# Patient Record
Sex: MALE | Race: BLACK OR AFRICAN AMERICAN | Employment: OTHER | URBAN - METROPOLITAN AREA
[De-identification: names, ages, dates, MRNs, and addresses within clinical notes are randomized per-mention and may not be internally consistent; named-entity substitution may affect disease eponyms.]

---

## 2017-01-25 ENCOUNTER — HOSPITAL ENCOUNTER (OUTPATIENT)
Dept: LAB | Age: 77
Discharge: HOME | End: 2017-01-25
Attending: INTERNAL MEDICINE
Payer: MEDICARE

## 2017-01-25 DIAGNOSIS — E03.9 ACQUIRED HYPOTHYROIDISM: ICD-10-CM

## 2017-01-25 DIAGNOSIS — E11.40 CONTROLLED TYPE 2 DIABETES MELLITUS WITH DIABETIC NEUROPATHY, WITHOUT LONG-TERM CURRENT USE OF INSULIN (HCC): ICD-10-CM

## 2017-01-25 PROBLEM — I10 ESSENTIAL HYPERTENSION: Status: ACTIVE | Noted: 2017-01-25

## 2017-01-25 LAB
ALBUMIN SERPL BCP-MCNC: 4 G/DL (ref 3.2–4.6)
ALBUMIN/GLOB SERPL: 1.1 {RATIO}
ALP SERPL-CCNC: 91 U/L (ref 50–136)
ALT SERPL-CCNC: 36 U/L (ref 12–65)
ANION GAP BLD CALC-SCNC: 9 MMOL/L
AST SERPL W P-5'-P-CCNC: 33 U/L (ref 15–37)
BASOPHILS # BLD AUTO: 0 K/UL (ref 0–0.2)
BASOPHILS # BLD: 1 % (ref 0–2)
BILIRUB SERPL-MCNC: 1 MG/DL (ref 0.2–1.1)
BUN SERPL-MCNC: 14 MG/DL (ref 8–23)
CALCIUM SERPL-MCNC: 8.7 MG/DL (ref 8.3–10.4)
CHLORIDE SERPL-SCNC: 102 MMOL/L (ref 98–107)
CHOLEST SERPL-MCNC: 138 MG/DL
CO2 SERPL-SCNC: 32 MMOL/L (ref 23–32)
CREAT SERPL-MCNC: 1.2 MG/DL (ref 0.6–1)
DIFFERENTIAL METHOD BLD: ABNORMAL
EOSINOPHIL # BLD: 0.1 K/UL (ref 0–0.8)
EOSINOPHIL NFR BLD: 3 % (ref 0.5–7.8)
ERYTHROCYTE [DISTWIDTH] IN BLOOD BY AUTOMATED COUNT: 12.7 % (ref 11.9–14.6)
EST. AVERAGE GLUCOSE BLD GHB EST-MCNC: 163 MG/DL
GLOBULIN SER CALC-MCNC: 3.8 G/DL
GLUCOSE SERPL-MCNC: 116 MG/DL (ref 65–100)
HBA1C MFR BLD: 7.3 % (ref 4.8–6)
HCT VFR BLD AUTO: 45.4 % (ref 41.1–50.3)
HDLC SERPL-MCNC: 52 MG/DL (ref 40–60)
HDLC SERPL: 2.7 {RATIO}
HGB BLD-MCNC: 15.6 G/DL (ref 13.6–17.2)
LDLC SERPL CALC-MCNC: 60.6 MG/DL
LIPID PROFILE,FLP: NORMAL
LYMPHOCYTES # BLD AUTO: 46 % (ref 13–44)
LYMPHOCYTES # BLD: 2 K/UL (ref 0.5–4.6)
MCH RBC QN AUTO: 30.4 PG (ref 26.1–32.9)
MCHC RBC AUTO-ENTMCNC: 34.4 G/DL (ref 31.4–35)
MCV RBC AUTO: 88.3 FL (ref 79.6–97.8)
MONOCYTES # BLD: 0.4 K/UL (ref 0.1–1.3)
MONOCYTES NFR BLD AUTO: 9 % (ref 4–12)
NEUTS SEG # BLD: 1.8 K/UL (ref 1.7–8.2)
NEUTS SEG NFR BLD AUTO: 41 % (ref 43–78)
PLATELET # BLD AUTO: 169 K/UL (ref 150–450)
PMV BLD AUTO: 10.5 FL (ref 10.8–14.1)
POTASSIUM SERPL-SCNC: 4.2 MMOL/L (ref 3.5–5.1)
PROT SERPL-MCNC: 7.8 G/DL (ref 6.3–8.2)
RBC # BLD AUTO: 5.14 M/UL (ref 4.23–5.67)
SODIUM SERPL-SCNC: 143 MMOL/L (ref 136–145)
TRIGL SERPL-MCNC: 127 MG/DL (ref 35–150)
TSH SERPL DL<=0.005 MIU/L-ACNC: 10.9 UIU/ML (ref 0.36–3.74)
VLDLC SERPL CALC-MCNC: 25.4 MG/DL
WBC # BLD AUTO: 4.4 K/UL (ref 4.3–11.1)

## 2017-01-25 PROCEDURE — 80053 COMPREHEN METABOLIC PANEL: CPT | Performed by: INTERNAL MEDICINE

## 2017-01-25 PROCEDURE — 85025 COMPLETE CBC W/AUTO DIFF WBC: CPT | Performed by: INTERNAL MEDICINE

## 2017-01-25 PROCEDURE — 82570 ASSAY OF URINE CREATININE: CPT | Performed by: INTERNAL MEDICINE

## 2017-01-25 PROCEDURE — 83036 HEMOGLOBIN GLYCOSYLATED A1C: CPT | Performed by: INTERNAL MEDICINE

## 2017-01-25 PROCEDURE — 36415 COLL VENOUS BLD VENIPUNCTURE: CPT | Performed by: INTERNAL MEDICINE

## 2017-01-25 PROCEDURE — 84443 ASSAY THYROID STIM HORMONE: CPT | Performed by: INTERNAL MEDICINE

## 2017-01-25 PROCEDURE — 80061 LIPID PANEL: CPT | Performed by: INTERNAL MEDICINE

## 2017-01-26 LAB
CREAT UR-MCNC: 94 MG/DL
MICROALBUMIN UR-MCNC: 5.49 MG/DL
MICROALBUMIN/CREAT UR-RTO: 58 MG/G

## 2017-01-26 PROCEDURE — 82043 UR ALBUMIN QUANTITATIVE: CPT | Performed by: INTERNAL MEDICINE

## 2017-01-26 NOTE — PROGRESS NOTES
Lipids good-Hb nl-A1C good at 7.3--kidney test stable-thyroid is off --not on quite enough--this could occur if missed some doses and think best to recheck it next visit

## 2017-02-28 ENCOUNTER — HOSPITAL ENCOUNTER (OUTPATIENT)
Dept: CT IMAGING | Age: 77
Discharge: HOME | End: 2017-02-28
Attending: UROLOGY
Payer: MEDICARE

## 2017-02-28 DIAGNOSIS — R31.0 GROSS HEMATURIA: ICD-10-CM

## 2017-02-28 PROCEDURE — 74011000258 HC RX REV CODE- 258: Performed by: UROLOGY

## 2017-02-28 PROCEDURE — 74178 CT ABD&PLV WO CNTR FLWD CNTR: CPT

## 2017-02-28 PROCEDURE — 74011636320 HC RX REV CODE- 636/320: Performed by: UROLOGY

## 2017-02-28 RX ORDER — SODIUM CHLORIDE 0.9 % (FLUSH) 0.9 %
10 SYRINGE (ML) INJECTION
Status: COMPLETED | OUTPATIENT
Start: 2017-02-28 | End: 2017-02-28

## 2017-02-28 RX ADMIN — IOVERSOL 100 ML: 741 INJECTION INTRA-ARTERIAL; INTRAVENOUS at 14:27

## 2017-02-28 RX ADMIN — SODIUM CHLORIDE 100 ML: 900 INJECTION, SOLUTION INTRAVENOUS at 14:27

## 2017-02-28 RX ADMIN — Medication 10 ML: at 14:27

## 2017-03-31 PROBLEM — R97.20 ELEVATED PSA: Status: ACTIVE | Noted: 2017-03-31

## 2017-09-26 PROBLEM — K59.00 CONSTIPATION: Status: ACTIVE | Noted: 2017-06-28

## 2017-09-26 PROBLEM — Z12.11 COLON CANCER SCREENING: Status: ACTIVE | Noted: 2017-06-28

## 2017-11-30 ENCOUNTER — HOSPITAL ENCOUNTER (OUTPATIENT)
Dept: GENERAL RADIOLOGY | Age: 77
Discharge: HOME | End: 2017-11-30
Payer: MEDICARE

## 2017-11-30 DIAGNOSIS — M25.551 RIGHT HIP PAIN: ICD-10-CM

## 2017-11-30 PROCEDURE — 73502 X-RAY EXAM HIP UNI 2-3 VIEWS: CPT

## 2017-12-01 NOTE — PROGRESS NOTES
X-ray shows some osteoarthritis both hips.  Can refer to ortho with persistent pain and he can call next week if conservative measures dont help

## 2017-12-04 NOTE — PROGRESS NOTES
Pt was made aware of his x-ray and he said at this point he would hold on referral to ortho and he's going to continue taking Mobic  (rx written Qt 30 with 2 refills so pt said he will try rx for the next couple months and will call us back ic sx worsens)

## 2018-01-30 PROBLEM — E78.00 HYPERCHOLESTEROLEMIA: Status: ACTIVE | Noted: 2018-01-30

## 2018-04-02 PROBLEM — R31.9 HEMATURIA: Status: ACTIVE | Noted: 2018-04-02

## 2018-04-02 PROCEDURE — 88112 CYTOPATH CELL ENHANCE TECH: CPT | Performed by: UROLOGY

## 2018-04-03 ENCOUNTER — HOSPITAL ENCOUNTER (OUTPATIENT)
Dept: LAB | Age: 78
Discharge: HOME | End: 2018-04-03

## 2018-10-01 PROBLEM — R31.0 GROSS HEMATURIA: Status: ACTIVE | Noted: 2018-10-01

## 2018-10-25 PROBLEM — R31.0 GROSS HEMATURIA: Status: RESOLVED | Noted: 2018-10-01 | Resolved: 2018-10-25

## 2019-09-24 PROBLEM — Z12.11 COLON CANCER SCREENING: Status: RESOLVED | Noted: 2017-06-28 | Resolved: 2019-09-24

## 2021-02-19 ENCOUNTER — HOSPITAL ENCOUNTER (OUTPATIENT)
Dept: CT IMAGING | Age: 81
Discharge: HOME | End: 2021-02-19
Attending: INTERNAL MEDICINE

## 2021-02-19 DIAGNOSIS — R10.9 ABDOMINAL PAIN, UNSPECIFIED ABDOMINAL LOCATION: ICD-10-CM

## 2021-02-19 RX ORDER — SODIUM CHLORIDE 0.9 % (FLUSH) 0.9 %
10 SYRINGE (ML) INJECTION
Status: COMPLETED | OUTPATIENT
Start: 2021-02-19 | End: 2021-02-19

## 2021-02-19 RX ADMIN — Medication 10 ML: at 15:26

## 2022-03-18 PROBLEM — E03.9 ACQUIRED HYPOTHYROIDISM: Status: ACTIVE | Noted: 2017-01-25

## 2022-03-18 PROBLEM — R97.20 ELEVATED PSA: Status: ACTIVE | Noted: 2017-03-31

## 2022-03-19 PROBLEM — R31.9 HEMATURIA: Status: ACTIVE | Noted: 2018-04-02

## 2022-03-19 PROBLEM — E11.40 CONTROLLED TYPE 2 DIABETES MELLITUS WITH DIABETIC NEUROPATHY, WITHOUT LONG-TERM CURRENT USE OF INSULIN (HCC): Status: ACTIVE | Noted: 2017-01-25

## 2022-03-19 PROBLEM — K59.00 CONSTIPATION: Status: ACTIVE | Noted: 2017-06-28

## 2022-03-20 PROBLEM — I10 ESSENTIAL HYPERTENSION: Status: ACTIVE | Noted: 2017-01-25

## 2022-03-20 PROBLEM — E78.00 HYPERCHOLESTEROLEMIA: Status: ACTIVE | Noted: 2018-01-30

## 2022-07-12 DIAGNOSIS — I10 ESSENTIAL (PRIMARY) HYPERTENSION: ICD-10-CM

## 2022-07-12 RX ORDER — AMLODIPINE BESYLATE 5 MG/1
TABLET ORAL
Qty: 90 TABLET | Refills: 3 | Status: SHIPPED | OUTPATIENT
Start: 2022-07-12

## 2022-08-10 ENCOUNTER — OFFICE VISIT (OUTPATIENT)
Dept: INTERNAL MEDICINE CLINIC | Facility: CLINIC | Age: 82
End: 2022-08-10
Payer: MEDICARE

## 2022-08-10 VITALS
SYSTOLIC BLOOD PRESSURE: 128 MMHG | BODY MASS INDEX: 30.52 KG/M2 | HEIGHT: 71 IN | DIASTOLIC BLOOD PRESSURE: 68 MMHG | WEIGHT: 218 LBS

## 2022-08-10 DIAGNOSIS — Z23 ENCOUNTER FOR IMMUNIZATION: ICD-10-CM

## 2022-08-10 DIAGNOSIS — E11.40 CONTROLLED TYPE 2 DIABETES MELLITUS WITH DIABETIC NEUROPATHY, WITHOUT LONG-TERM CURRENT USE OF INSULIN (HCC): Primary | ICD-10-CM

## 2022-08-10 DIAGNOSIS — E03.9 ACQUIRED HYPOTHYROIDISM: ICD-10-CM

## 2022-08-10 DIAGNOSIS — I10 ESSENTIAL HYPERTENSION: ICD-10-CM

## 2022-08-10 PROCEDURE — 3052F HG A1C>EQUAL 8.0%<EQUAL 9.0%: CPT | Performed by: INTERNAL MEDICINE

## 2022-08-10 PROCEDURE — 1036F TOBACCO NON-USER: CPT | Performed by: INTERNAL MEDICINE

## 2022-08-10 PROCEDURE — 99214 OFFICE O/P EST MOD 30 MIN: CPT | Performed by: INTERNAL MEDICINE

## 2022-08-10 PROCEDURE — 90677 PCV20 VACCINE IM: CPT | Performed by: INTERNAL MEDICINE

## 2022-08-10 PROCEDURE — G8427 DOCREV CUR MEDS BY ELIG CLIN: HCPCS | Performed by: INTERNAL MEDICINE

## 2022-08-10 PROCEDURE — 1123F ACP DISCUSS/DSCN MKR DOCD: CPT | Performed by: INTERNAL MEDICINE

## 2022-08-10 PROCEDURE — G8417 CALC BMI ABV UP PARAM F/U: HCPCS | Performed by: INTERNAL MEDICINE

## 2022-08-10 PROCEDURE — G0009 ADMIN PNEUMOCOCCAL VACCINE: HCPCS | Performed by: INTERNAL MEDICINE

## 2022-08-10 ASSESSMENT — ENCOUNTER SYMPTOMS
COUGH: 0
SHORTNESS OF BREATH: 0

## 2022-08-10 ASSESSMENT — PATIENT HEALTH QUESTIONNAIRE - PHQ9
SUM OF ALL RESPONSES TO PHQ QUESTIONS 1-9: 0
SUM OF ALL RESPONSES TO PHQ9 QUESTIONS 1 & 2: 0
SUM OF ALL RESPONSES TO PHQ QUESTIONS 1-9: 0
2. FEELING DOWN, DEPRESSED OR HOPELESS: 0
SUM OF ALL RESPONSES TO PHQ QUESTIONS 1-9: 0
SUM OF ALL RESPONSES TO PHQ QUESTIONS 1-9: 0
1. LITTLE INTEREST OR PLEASURE IN DOING THINGS: 0

## 2022-08-10 NOTE — PROGRESS NOTES
SUBJECTIVE:   Jade Fisher is a 81 y.o. male seen for a visit regarding   Chief Complaint   Patient presents with    Cholesterol Problem     4 month f/u    Diabetes    Hypertension        Diabetes  He presents for his follow-up diabetic visit. He has type 2 diabetes mellitus. His disease course has been stable. There are no hypoglycemic associated symptoms. Pertinent negatives for diabetes include no chest pain. Symptoms are stable. He is compliant with treatment all of the time. There is no change (checks qod -runs 130's to 140's -rare 160-A1C 8.2 in April -was 8.1 prior) in his home blood glucose trend. Hypertension  This is a chronic problem. The current episode started more than 1 year ago. The problem is unchanged. Pertinent negatives include no chest pain, palpitations or shortness of breath. Past treatments include ACE inhibitors, calcium channel blockers and diuretics. Hyperlipidemia  This is a chronic problem. The problem is controlled. Pertinent negatives include no chest pain or shortness of breath. Current antihyperlipidemic treatment includes statins. There are no compliance problems. Past Medical History, Past Surgical History, Family history, Social History, and Medications were all reviewed with the patient today and updated as necessary.        Current Outpatient Medications   Medication Sig Dispense Refill    amLODIPine (NORVASC) 5 MG tablet TAKE 1 TABLET BY MOUTH EVERY DAY 90 tablet 3    atorvastatin (LIPITOR) 20 MG tablet Take 20 mg by mouth daily      bimatoprost (LUMIGAN) 0.01 % SOLN ophthalmic drops Apply 1 drop to eye      cyanocobalamin 1000 MCG tablet Take 1,000 mcg by mouth daily      finasteride (PROSCAR) 5 MG tablet Take 5 mg by mouth daily      glipiZIDE-metFORMIN (METAGLIP) 5-500 MG per tablet 2 in the AM and 1 at HS      hydroCHLOROthiazide (HYDRODIURIL) 25 MG tablet Take 37.5 mg by mouth daily      ketoconazole (NIZORAL) 2 % cream Apply topically 2 times daily levothyroxine (SYNTHROID) 125 MCG tablet TAKE 2 TABS BY MOUTH DAILY (BEFORE BREAKFAST). USES 250MG 4 DAYS 125 NEXT 3DAYS (FRI, SAT, SUN)      losartan (COZAAR) 100 MG tablet Take 100 mg by mouth daily      tamsulosin (FLOMAX) 0.4 MG capsule Take 0.4 mg by mouth daily       No current facility-administered medications for this visit. No Known Allergies  Patient Active Problem List   Diagnosis    Benign prostatic hyperplasia with urinary obstruction and other lower urinary tract symptoms    Elevated PSA    Acquired hypothyroidism    Urinary retention    Osteoarthritis of multiple joints    Hematuria    Constipation    Controlled type 2 diabetes mellitus with diabetic neuropathy, without long-term current use of insulin ([facility])    S/P total knee replacement    Hypercholesterolemia    Essential hypertension     Social History     Tobacco Use    Smoking status: Former     Packs/day: 0.50     Types: Cigarettes     Quit date: 1988     Years since quittin.2    Smokeless tobacco: Never   Substance Use Topics    Alcohol use: No          Review of Systems   Constitutional:  Negative for unexpected weight change. Respiratory:  Negative for cough and shortness of breath. Cardiovascular:  Negative for chest pain and palpitations. Endocrine: Negative for cold intolerance and heat intolerance. TSH 5.50 in April down from 7.47-uses 250 for 4d and 125 next 3d   Neurological:  Negative for numbness. OBJECTIVE:  /68   Ht 5' 11\" (1.803 m)   Wt 218 lb (98.9 kg)   BMI 30.40 kg/m²      Physical Exam       Medical problems and test results were reviewed with the patient today. ASSESSMENT and PLAN     1. Controlled type 2 diabetes mellitus with diabetic neuropathy, without long-term current use of insulin (HCC)  -     Hemoglobin A1C; Future  -     Microalbumin / Creatinine Urine Ratio; Future  -     Basic Metabolic Panel; Future  2. Essential hypertension  3.  Acquired hypothyroidism  -     TSH with Reflex; Future  4. Encounter for immunization  -     Pneumococcal, PCV20, PREVNAR 21, (age 25 yrs+), IM, PF     the following changes in treatment are made A1c still hi-go to 2 bid glip/met--BP good; lipid was ok recheck thyroid -at 5 TSH ok -see 4 mo-prevnar today  lab results and schedule for future lab studies reviewed with patient  reviewed medications and side effects in detail     Return in about 4 months (around 12/10/2022).

## 2022-08-12 DIAGNOSIS — E11.40 CONTROLLED TYPE 2 DIABETES MELLITUS WITH DIABETIC NEUROPATHY, WITHOUT LONG-TERM CURRENT USE OF INSULIN (HCC): ICD-10-CM

## 2022-08-12 DIAGNOSIS — E03.9 ACQUIRED HYPOTHYROIDISM: ICD-10-CM

## 2022-08-12 LAB
ANION GAP SERPL CALC-SCNC: 6 MMOL/L (ref 7–16)
BUN SERPL-MCNC: 19 MG/DL (ref 8–23)
CALCIUM SERPL-MCNC: 8.8 MG/DL (ref 8.3–10.4)
CHLORIDE SERPL-SCNC: 112 MMOL/L (ref 98–107)
CO2 SERPL-SCNC: 23 MMOL/L (ref 21–32)
CREAT SERPL-MCNC: 1.4 MG/DL (ref 0.8–1.5)
CREAT UR-MCNC: 183 MG/DL
GLUCOSE SERPL-MCNC: 133 MG/DL (ref 65–100)
MICROALBUMIN UR-MCNC: 3.72 MG/DL
MICROALBUMIN/CREAT UR-RTO: 20 MG/G
POTASSIUM SERPL-SCNC: 3.6 MMOL/L (ref 3.5–5.1)
SODIUM SERPL-SCNC: 141 MMOL/L (ref 138–145)
TSH W FREE THYROID IF ABNORMAL: 6.9 UIU/ML (ref 0.36–3.74)

## 2022-08-13 LAB
EST. AVERAGE GLUCOSE BLD GHB EST-MCNC: 174 MG/DL
HBA1C MFR BLD: 7.7 % (ref 4.8–5.6)
T4 FREE SERPL-MCNC: 0.8 NG/DL (ref 0.78–1.46)

## 2022-10-03 ENCOUNTER — TELEPHONE (OUTPATIENT)
Dept: UROLOGY | Age: 82
End: 2022-10-03

## 2022-10-03 ENCOUNTER — OFFICE VISIT (OUTPATIENT)
Dept: UROLOGY | Age: 82
End: 2022-10-03
Payer: MEDICARE

## 2022-10-03 DIAGNOSIS — N40.1 BENIGN PROSTATIC HYPERPLASIA WITH LOWER URINARY TRACT SYMPTOMS, SYMPTOM DETAILS UNSPECIFIED: ICD-10-CM

## 2022-10-03 DIAGNOSIS — R97.20 ELEVATED PSA: Primary | ICD-10-CM

## 2022-10-03 DIAGNOSIS — N39.41 URGE INCONTINENCE: ICD-10-CM

## 2022-10-03 LAB
BILIRUBIN, URINE, POC: NEGATIVE
BLOOD URINE, POC: NEGATIVE
GLUCOSE URINE, POC: NEGATIVE
KETONES, URINE, POC: NEGATIVE
LEUKOCYTE ESTERASE, URINE, POC: NEGATIVE
NITRITE, URINE, POC: NEGATIVE
PH, URINE, POC: 6 (ref 4.6–8)
PROTEIN,URINE, POC: NEGATIVE
PVR, POC: ABNORMAL CC
SPECIFIC GRAVITY, URINE, POC: 1.02 (ref 1–1.03)
URINALYSIS CLARITY, POC: NORMAL
URINALYSIS COLOR, POC: NORMAL
UROBILINOGEN, POC: NORMAL

## 2022-10-03 PROCEDURE — 51798 US URINE CAPACITY MEASURE: CPT | Performed by: UROLOGY

## 2022-10-03 PROCEDURE — 99214 OFFICE O/P EST MOD 30 MIN: CPT | Performed by: UROLOGY

## 2022-10-03 PROCEDURE — G8417 CALC BMI ABV UP PARAM F/U: HCPCS | Performed by: UROLOGY

## 2022-10-03 PROCEDURE — 81002 URINALYSIS NONAUTO W/O SCOPE: CPT | Performed by: UROLOGY

## 2022-10-03 PROCEDURE — G8427 DOCREV CUR MEDS BY ELIG CLIN: HCPCS | Performed by: UROLOGY

## 2022-10-03 PROCEDURE — 1036F TOBACCO NON-USER: CPT | Performed by: UROLOGY

## 2022-10-03 PROCEDURE — 1123F ACP DISCUSS/DSCN MKR DOCD: CPT | Performed by: UROLOGY

## 2022-10-03 PROCEDURE — G8484 FLU IMMUNIZE NO ADMIN: HCPCS | Performed by: UROLOGY

## 2022-10-03 NOTE — PROGRESS NOTES
[facility]  [address]  [address] [facility]  [facility], [address] [facility]  [phone]    Nael Membreno  : 1940    Chief Complaint   Patient presents with    Elevated PSA        HPI     Nael Membreno is a 81 y.o. male   History of BPH, microwave thermotherapy treatment of the prostate in the past. The patient had been taking Avodart chronically. In  started having some gross hematuria, some dysuria. Went to   an  facility and was given an antibiotic. CT was obtained. CT showed  IMPRESSION:   1. Large mass in the bladder with circumferential wall   thickening. Although this may simply represent hematoma or blood   clot, neoplasm must be strongly considered. Recommend correlation   with direct visualization at cystoscopy. 2. A 2 mm nonobstructing right peripelvic renal stone. There is   no hydronephrosis or hydroureter. 3. Lobular hyperplasia of the left adrenal gland. 4. Indeterminate 1.5 x 1.0 cm right iliac lymph node. 5. Indeterminate tiny sclerotic foci about the pelvis may simple   represent bone islands. Cysto in office showed large BPH. Had TURP 14 . path shows 12   gm BPH and chronic prostatitis. Has stopped the Avodart. Is voiding much better, but having some urgency, double voiding,   nocturia x 3. Cysto showed S/p TURP but with large anterior lobe, both   intravesical and partially obstructing the prostate fossa. ,   without bladder neck contracture. We had discussed repeat TURP. He stated that he would rather take meds. He is on Tamsulosin and LUTS are much better. In  had  gross painless hematuria on 3 occasions. Had CT w/wo on 17: IMPRESSION:   1. The only potential etiology for the patient's hematuria is that there is  severe enlargement of the prostate gland which has slightly worsened when  compared to the prior CT study.  No evidence for stone disease, enhancing renal  cortical lesion, or focal urinary bladder wall lesion is otherwise seen. Hematuria has resolved. PSA 8.9 in 2015. Cysto in 3-17 showed s/p TURP, huge intravesical prostate lobe anteriorly. He was started on Finasteride in addition to Tamsulosin. ? Hematuria felt to be  from massive BPH. . If he comes to surgery , may need open prostatectomy. He is voiding well. PSA 5.7 in 9-17. PSA 3.9 in 3-18. Had  microscopic hematuria in 4-18; had negative urine cytology. PSA 4.2 in 9-18. He called on 9-25-18 after having gross hematuria. This stopped, currently without problems. Had Staph UTI in 10-18. PSA 3.2 in 9-19, corrected to 6.4 .   . Remains on tamsulosin and finasteride. Has been on finasteride since 2017. PSA 4.1 in 9-20, corrected to 8.2   Has tried Cialis 5 mg daily with no improvement in ED. ALEX in 10-20 showed 4+ prostate without nodules. PSA 5.4 in 3-21, corrected to 10.8. He is having some urge incontinence. Wears a pad. Slow rise in PSA. Urge incontinence is bothering him. here for PVR,cysto, AUASS. Continue tamsulosin and finasteride. Cysto in 4-21 showed huge intravesical lobe     PVR 8 ml by US  AUASS is 22 , with highest score of 5 for \"urgency\". QOL is \"pleased\". Added Detrol to tamsulosin and finasteride in 4-21. He states that his voiding is not a problem. PVR 52 ml by US today. PSA 5.5, corrected to 11.0. PSA 5.2 in 9-22, corrected to 10.4. He states that he is having urgency and urge incontinence. PVR today by US is >120 ml. Past Medical History:   Diagnosis Date    Chronic pain     arthritic    Diabetes ([address] 2000?     typell, chks blood glucose rarely , last HA1B 5.6    Enlarged prostate     \"improved since TURP\"    Hypercholesteremia     Hypertension     well controlled by valsartan/hctz    Hypothyroidism          Obesity (BMI 30.0-34.9)     Osteoarthritis          Vitamin B 12 deficiency     Vitamin D deficiency      Past Surgical History:   Procedure Laterality Date    CHOLECYSTECTOMY, LAPAROSCOPIC  2007 COLONOSCOPY  2017    ORTHOPEDIC SURGERY      Total right knee 2015    OTHER SURGICAL HISTORY      prostate laser surgery \"before the TURP\"    TURP  2013     Current Outpatient Medications   Medication Sig Dispense Refill    amLODIPine (NORVASC) 5 MG tablet TAKE 1 TABLET BY MOUTH EVERY DAY 90 tablet 3    atorvastatin (LIPITOR) 20 MG tablet Take 20 mg by mouth daily      bimatoprost (LUMIGAN) 0.01 % SOLN ophthalmic drops Apply 1 drop to eye      cyanocobalamin 1000 MCG tablet Take 1,000 mcg by mouth daily      finasteride (PROSCAR) 5 MG tablet Take 5 mg by mouth daily      glipiZIDE-metFORMIN (METAGLIP) 5-500 MG per tablet 2 in the AM and 1 at HS      hydroCHLOROthiazide (HYDRODIURIL) 25 MG tablet Take 37.5 mg by mouth daily      ketoconazole (NIZORAL) 2 % cream Apply topically 2 times daily      levothyroxine (SYNTHROID) 125 MCG tablet TAKE 2 TABS BY MOUTH DAILY (BEFORE BREAKFAST). USES 250MG 4 DAYS 125 NEXT 3DAYS (FRI, SAT, SUN)      losartan (COZAAR) 100 MG tablet Take 100 mg by mouth daily      tamsulosin (FLOMAX) 0.4 MG capsule Take 0.4 mg by mouth daily       No current facility-administered medications for this visit.      No Known Allergies  Social History     Socioeconomic History    Marital status:      Spouse name: Not on file    Number of children: Not on file    Years of education: Not on file    Highest education level: Not on file   Occupational History    Not on file   Tobacco Use    Smoking status: Former     Packs/day: 0.50     Types: Cigarettes     Quit date: 1988     Years since quittin.3    Smokeless tobacco: Never   Substance and Sexual Activity    Alcohol use: No    Drug use: No    Sexual activity: Not on file   Other Topics Concern    Not on file   Social History Narrative    Not on file     Social Determinants of Health     Financial Resource Strain: Not on file   Food Insecurity: Not on file   Transportation Needs: Not on file   Physical Activity: Not on file Stress: Not on file   Social Connections: Not on file   Intimate Partner Violence: Not on file   Housing Stability: Not on file     Family History   Problem Relation Age of Onset    Cancer Mother     Cancer Father     Hypertension Father     Cancer Sister     Hypertension Brother     Stroke Brother     Diabetes Sister     Hypertension Sister     Hypertension Sister     Hypertension Sister     Hypertension Sister     Hypertension Sister     Hypertension Brother     Stroke Brother     Hypertension Brother     Stroke Brother     Hypertension Brother     Stroke Brother        ROS    There were no vitals taken for this visit. Urinalysis  UA - Dipstick  No results found for this or any previous visit. UA - Micro  WBC - 0  RBC - 0  Bacteria - 0  Epith - 0    Physical Exam    Assessment and Plan    Paige Castrejon was seen today for elevated psa. Diagnoses and all orders for this visit:    Elevated PSA  -     AMB POC URINALYSIS DIP STICK MANUAL W/O MICRO  -     AMB POC PVR, YORDAN,POST-VOID RES,US,NON-IMAGING    Benign prostatic hyperplasia with lower urinary tract symptoms, symptom details unspecified  -     AMB POC PVR, YORDAN,POST-VOID RES,US,NON-IMAGING    Urge incontinence     CT from 2021 shows huge prostate with large intravesical component. Follow-up and Dispositions    Return in 4 weeks (on 10/31/2022). is retaining urine. Recommend that he stop the Detrol. Conitinue tamsulosin and finasteride. Chart says he's not on Detrol. May need surgery. Will return for TRUS to measure prostate. Trus to measure prostate on return no biopsy. Told him to take Fleet's enema.

## 2022-10-31 ENCOUNTER — OFFICE VISIT (OUTPATIENT)
Dept: UROLOGY | Age: 82
End: 2022-10-31
Payer: MEDICARE

## 2022-10-31 DIAGNOSIS — R97.20 ELEVATED PSA: ICD-10-CM

## 2022-10-31 DIAGNOSIS — R33.9 URINARY RETENTION: ICD-10-CM

## 2022-10-31 DIAGNOSIS — N39.41 URGE INCONTINENCE: ICD-10-CM

## 2022-10-31 DIAGNOSIS — N40.1 BENIGN PROSTATIC HYPERPLASIA WITH LOWER URINARY TRACT SYMPTOMS, SYMPTOM DETAILS UNSPECIFIED: Primary | ICD-10-CM

## 2022-10-31 PROCEDURE — G8427 DOCREV CUR MEDS BY ELIG CLIN: HCPCS | Performed by: UROLOGY

## 2022-10-31 PROCEDURE — 1123F ACP DISCUSS/DSCN MKR DOCD: CPT | Performed by: UROLOGY

## 2022-10-31 PROCEDURE — G8417 CALC BMI ABV UP PARAM F/U: HCPCS | Performed by: UROLOGY

## 2022-10-31 PROCEDURE — G8484 FLU IMMUNIZE NO ADMIN: HCPCS | Performed by: UROLOGY

## 2022-10-31 PROCEDURE — 99213 OFFICE O/P EST LOW 20 MIN: CPT | Performed by: UROLOGY

## 2022-10-31 PROCEDURE — 1036F TOBACCO NON-USER: CPT | Performed by: UROLOGY

## 2022-10-31 NOTE — PROGRESS NOTES
[facility]  [address]   [address]  [facility], [address] [facility]  [phone]    Anirudh Asencio  : 1940         HPI   81 y.o., male   History of BPH, microwave thermotherapy treatment of the prostate in the past. The patient had been taking Avodart chronically. In  started having some gross hematuria, some dysuria. Went to   an  facility and was given an antibiotic. CT was obtained. CT showed  IMPRESSION:   1. Large mass in the bladder with circumferential wall   thickening. Although this may simply represent hematoma or blood   clot, neoplasm must be strongly considered. Recommend correlation   with direct visualization at cystoscopy. 2. A 2 mm nonobstructing right peripelvic renal stone. There is   no hydronephrosis or hydroureter. 3. Lobular hyperplasia of the left adrenal gland. 4. Indeterminate 1.5 x 1.0 cm right iliac lymph node. 5. Indeterminate tiny sclerotic foci about the pelvis may simple   represent bone islands. Cysto in office showed large BPH. Had TURP 14 . path shows 12   gm BPH and chronic prostatitis. Has stopped the Avodart. Is voiding much better, but having some urgency, double voiding,   nocturia x 3. Cysto showed S/p TURP but with large anterior lobe, both   intravesical and partially obstructing the prostate fossa. ,   without bladder neck contracture. We had discussed repeat TURP. He stated that he would rather take meds. He is on Tamsulosin and LUTS are much better. In  had  gross painless hematuria on 3 occasions. Had CT w/wo on 17: IMPRESSION:   1. The only potential etiology for the patient's hematuria is that there is  severe enlargement of the prostate gland which has slightly worsened when  compared to the prior CT study. No evidence for stone disease, enhancing renal  cortical lesion, or focal urinary bladder wall lesion is otherwise seen. Hematuria has resolved. PSA 8.9 in 2015.   Cysto in 3-17 showed s/p TURP, huge intravesical prostate lobe anteriorly. He was started on Finasteride in addition to Tamsulosin. ? Hematuria felt to be  from massive BPH. . If he comes to surgery , may need open prostatectomy. He is voiding well. PSA 5.7 in 9-17. PSA 3.9 in 3-18. Had  microscopic hematuria in 4-18; had negative urine cytology. PSA 4.2 in 9-18. He called on 9-25-18 after having gross hematuria. This stopped, currently without problems. Had Staph UTI in 10-18. PSA 3.2 in 9-19, corrected to 6.4 .   . Remains on tamsulosin and finasteride. Has been on finasteride since 2017. PSA 4.1 in 9-20, corrected to 8.2   Has tried Cialis 5 mg daily with no improvement in ED. ALEX in 10-20 showed 4+ prostate without nodules. PSA 5.4 in 3-21, corrected to 10.8. He is having some urge incontinence. Wears a pad. Slow rise in PSA. Urge incontinence is bothering him. Continues tamsulosin and finasteride. Cysto in 4-21 showed huge intravesical lobe     PVR 8 ml by US  AUASS is 22 , with highest score of 5 for \"urgency\". QOL is \"pleased\". Added Detrol to tamsulosin and finasteride in 4-21. He states that his voiding is not a problem. PVR 52 ml by US today. PSA 5.5, corrected to 11.0. PSA 5.2 in 9-22, corrected to 10.4. He states that he is having urgency and urge incontinence. PVR in 10-22 by US is >120 ml.     CT from 2021 shows huge prostate with large intravesical component. is retaining urine. stopped the Detrol. Conitinue tamsulosin and finasteride. Chart says he's not on Detrol. May need surgery. Will return for TRUS to measure prostate. Past Medical History:   Diagnosis Date    Chronic pain     arthritic    Diabetes ([facility] [address] 2000?     typell, chks blood glucose rarely , last HA1B 5.6    Enlarged prostate     \"improved since TURP\"    Hypercholesteremia     Hypertension     well controlled by valsartan/hctz    Hypothyroidism          Obesity (BMI 30.0-34.9)     Osteoarthritis Vitamin B 12 deficiency     Vitamin D deficiency      Past Surgical History:   Procedure Laterality Date    CHOLECYSTECTOMY, LAPAROSCOPIC  2007    COLONOSCOPY  2017    ORTHOPEDIC SURGERY      Total right knee 2015    OTHER SURGICAL HISTORY      prostate laser surgery \"before the TURP\"    TURP  2013     Current Outpatient Medications   Medication Sig Dispense Refill    amLODIPine (NORVASC) 5 MG tablet TAKE 1 TABLET BY MOUTH EVERY DAY 90 tablet 3    atorvastatin (LIPITOR) 20 MG tablet Take 20 mg by mouth daily      bimatoprost (LUMIGAN) 0.01 % SOLN ophthalmic drops Apply 1 drop to eye      cyanocobalamin 1000 MCG tablet Take 1,000 mcg by mouth daily      finasteride (PROSCAR) 5 MG tablet Take 5 mg by mouth daily      glipiZIDE-metFORMIN (METAGLIP) 5-500 MG per tablet 2 in the AM and 1 at HS      hydroCHLOROthiazide (HYDRODIURIL) 25 MG tablet Take 37.5 mg by mouth daily      ketoconazole (NIZORAL) 2 % cream Apply topically 2 times daily      levothyroxine (SYNTHROID) 125 MCG tablet TAKE 2 TABS BY MOUTH DAILY (BEFORE BREAKFAST). USES 250MG 4 DAYS 125 NEXT 3DAYS (FRI, SAT, SUN)      losartan (COZAAR) 100 MG tablet Take 100 mg by mouth daily      tamsulosin (FLOMAX) 0.4 MG capsule Take 0.4 mg by mouth daily       No current facility-administered medications for this visit.      No Known Allergies  Social History     Socioeconomic History    Marital status:      Spouse name: Not on file    Number of children: Not on file    Years of education: Not on file    Highest education level: Not on file   Occupational History    Not on file   Tobacco Use    Smoking status: Former     Packs/day: 0.50     Types: Cigarettes     Quit date: 1988     Years since quittin.4    Smokeless tobacco: Never   Substance and Sexual Activity    Alcohol use: No    Drug use: No    Sexual activity: Not on file   Other Topics Concern    Not on file   Social History Narrative    Not on file     Social Determinants of Health Financial Resource Strain: Not on file   Food Insecurity: Not on file   Transportation Needs: Not on file   Physical Activity: Not on file   Stress: Not on file   Social Connections: Not on file   Intimate Partner Violence: Not on file   Housing Stability: Not on file     Family History   Problem Relation Age of Onset    Cancer Mother     Cancer Father     Hypertension Father     Cancer Sister     Hypertension Brother     Stroke Brother     Diabetes Sister     Hypertension Sister     Hypertension Sister     Hypertension Sister     Hypertension Sister     Hypertension Sister     Hypertension Brother     Stroke Brother     Hypertension Brother     Stroke Brother     Hypertension Brother     Stroke Brother              Physical Exam  General   Mental Status - Patient is alert and oriented X3. Build & Nutrition - Well nourished. Chest and Lung Exam   Chest and lung exam reveals  - normal excursion with symmetric chest walls, quiet, even and easy respiratory effort with no use of accessory muscles and on auscultation, normal breath sounds, no adventitious sounds and normal vocal resonance. Cardiovascular   Cardiovascular examination reveals  - normal heart sounds, regular rate and rhythm with no murmurs. Abdomen   Palpation/Percussion: Palpation and Percussion of the abdomen reveal - Non Tender, No Rebound tenderness, No Rigidity (guarding), No hepatosplenomegaly, No Palpable abdominal masses and Soft. Hernia - Bilateral - No Hernia(s) present. TRANSRECTAL ULTRASOUND GUIDED BIOPSY OF THE PROSTATE    All risks, benefits and alternatives were again reviewed and he is willing to proceed at this time. The patient was placed in the left lateral decubitus position and the transrectal ultrasound probe was inserted into the rectum. The prostate was visualized and measured. The prostate appeared homogenous in appearance. Measured prostate volume is listed below. The ultrasound probe was removed.   The

## 2022-11-02 ENCOUNTER — TELEPHONE (OUTPATIENT)
Dept: UROLOGY | Age: 82
End: 2022-11-02

## 2022-11-02 NOTE — TELEPHONE ENCOUNTER
----- Message from Marisela Gallo MD sent at 10/31/2022 10:34 AM EDT -----  Schedule monopolar TURP. Did orders.

## 2022-11-07 NOTE — TELEPHONE ENCOUNTER
Procedures: Procedure(s):   CYSTOSCOPY MONOPOLAR TRANSURETHRAL RESECTION PROSTATE   Date: 12/1/2022   Time: 0800   Location: [facility] OP OR 04     Scheduled.

## 2022-11-30 ENCOUNTER — ANESTHESIA EVENT (OUTPATIENT)
Dept: SURGERY | Age: 82
End: 2022-11-30
Payer: MEDICARE

## 2022-11-30 RX ORDER — ACETAMINOPHEN 500 MG
500 TABLET ORAL EVERY 6 HOURS PRN
COMMUNITY

## 2022-11-30 RX ORDER — TAMSULOSIN HYDROCHLORIDE 0.4 MG/1
0.4 CAPSULE ORAL NIGHTLY
Status: ON HOLD | COMMUNITY
End: 2022-12-04 | Stop reason: HOSPADM

## 2022-11-30 NOTE — PERIOP NOTE
Patient wife Angus Puri. verified name and . POA documents in EHR. Order for consent not found in EHR: patient verifies procedure. Type 2 surgery, Phone assessment complete. Orders  received. Labs per surgeon: CBC with diff, UA, UA CX- pt wife states pt not available to come in for labs- pt working. Labs per anesthesia protocol: POC glucose    Patient wife answered medical/surgical history questions at their best of ability. All prior to admission medications documented in [facility]. Patient wife instructed to take the following medications the day of surgery according to anesthesia guidelines with a small sip of water: Norvasc, Lipitor, Proscar, and Synthroid. Hold all vitamins 7 days prior to surgery and NSAIDS 5 days prior to surgery. Prescription meds to hold:no additional.  Patient instructed on the following:    > Arrive at [facility], time of arrival to be called the day before by 1700  > NPO after midnight, unless otherwise indicated, including gum, mints, and ice chips  > Responsible adult must drive patient to the hospital, stay during surgery, and patient will need supervision 24 hours after anesthesia  > Use antibacterial Soap in shower the night before surgery and on the morning of surgery  > All piercings must be removed prior to arrival.    > Leave all valuables (money and jewelry) at home but bring insurance card and ID on DOS.   > You may be required to pay a deductible or co-pay on the day of your procedure. You can pre-pay by calling [phone] if your surgery is at the [facility] or [phone] if your surgery is at the [facility]. > Do not wear make-up, nail polish, lotions, cologne, perfumes, powders, or oil on skin. Artificial nails are not permitted.

## 2022-11-30 NOTE — PERIOP NOTE
Message left on vm to call back to receive preop instructions as well as to give arrival time information.   Our number given

## 2022-11-30 NOTE — PERIOP NOTE
Directly informed patient and or family member of pre op arrival time 36 on 12/1. All questions answered. Pre op instructions reviewed. Left contact information for any additional questions or needs.

## 2022-12-01 ENCOUNTER — ANESTHESIA (OUTPATIENT)
Dept: SURGERY | Age: 82
End: 2022-12-01
Payer: MEDICARE

## 2022-12-01 ENCOUNTER — APPOINTMENT (OUTPATIENT)
Dept: NON INVASIVE DIAGNOSTICS | Age: 82
End: 2022-12-01
Attending: UROLOGY
Payer: MEDICARE

## 2022-12-01 ENCOUNTER — HOSPITAL ENCOUNTER (OUTPATIENT)
Age: 82
Setting detail: OBSERVATION
LOS: 1 days | Discharge: HOME | End: 2022-12-04
Attending: UROLOGY | Admitting: UROLOGY
Payer: MEDICARE

## 2022-12-01 DIAGNOSIS — Z09 HOSPITAL DISCHARGE FOLLOW-UP: Primary | ICD-10-CM

## 2022-12-01 DIAGNOSIS — I44.2 CHB (COMPLETE HEART BLOCK) (HCC): ICD-10-CM

## 2022-12-01 DIAGNOSIS — I44.2 COMPLETE HEART BLOCK (HCC): ICD-10-CM

## 2022-12-01 DIAGNOSIS — N40.1 BENIGN PROSTATIC HYPERPLASIA WITH LOWER URINARY TRACT SYMPTOMS, SYMPTOM DETAILS UNSPECIFIED: ICD-10-CM

## 2022-12-01 PROBLEM — E03.9 ACQUIRED HYPOTHYROIDISM: Status: ACTIVE | Noted: 2017-01-25

## 2022-12-01 PROBLEM — N13.8 BPH WITH OBSTRUCTION/LOWER URINARY TRACT SYMPTOMS: Status: ACTIVE | Noted: 2022-12-01

## 2022-12-01 PROBLEM — E78.00 HYPERCHOLESTEROLEMIA: Status: ACTIVE | Noted: 2018-01-30

## 2022-12-01 PROBLEM — I10 ESSENTIAL HYPERTENSION: Status: ACTIVE | Noted: 2017-01-25

## 2022-12-01 PROBLEM — E11.40 CONTROLLED TYPE 2 DIABETES MELLITUS WITH DIABETIC NEUROPATHY, WITHOUT LONG-TERM CURRENT USE OF INSULIN (HCC): Status: ACTIVE | Noted: 2017-01-25

## 2022-12-01 LAB
ABO + RH BLD: NORMAL
ANION GAP SERPL CALC-SCNC: 4 MMOL/L (ref 2–11)
BLOOD GROUP ANTIBODIES SERPL: NORMAL
BUN SERPL-MCNC: 21 MG/DL (ref 8–23)
CALCIUM SERPL-MCNC: 8.3 MG/DL (ref 8.3–10.4)
CHLORIDE SERPL-SCNC: 106 MMOL/L (ref 101–110)
CO2 SERPL-SCNC: 28 MMOL/L (ref 21–32)
CREAT BLD-MCNC: 1.19 MG/DL (ref 0.8–1.5)
CREAT SERPL-MCNC: 1.5 MG/DL (ref 0.8–1.5)
ECHO AO ASC DIAM: 3.6 CM
ECHO AO ASCENDING AORTA INDEX: 1.65 CM/M2
ECHO AO ROOT DIAM: 4.1 CM
ECHO AO ROOT INDEX: 1.88 CM/M2
ECHO AV AREA PEAK VELOCITY: 3.6 CM2
ECHO AV AREA VTI: 3.8 CM2
ECHO AV AREA/BSA PEAK VELOCITY: 1.7 CM2/M2
ECHO AV AREA/BSA VTI: 1.7 CM2/M2
ECHO AV MEAN GRADIENT: 2 MMHG
ECHO AV MEAN VELOCITY: 0.7 M/S
ECHO AV PEAK GRADIENT: 4 MMHG
ECHO AV PEAK VELOCITY: 1 M/S
ECHO AV VELOCITY RATIO: 0.8
ECHO AV VTI: 25.5 CM
ECHO BSA: 2.22 M2
ECHO EST RA PRESSURE: 3 MMHG
ECHO IVC PROX: 2 CM
ECHO LA AREA 2C: 18.7 CM2
ECHO LA AREA 4C: 24.7 CM2
ECHO LA DIAMETER INDEX: 1.61 CM/M2
ECHO LA DIAMETER: 3.5 CM
ECHO LA MAJOR AXIS: 6.7 CM
ECHO LA MINOR AXIS: 5.1 CM
ECHO LA TO AORTIC ROOT RATIO: 0.85
ECHO LA VOL 2C: 57 ML (ref 18–58)
ECHO LA VOL 4C: 71 ML (ref 18–58)
ECHO LA VOLUME INDEX A2C: 26 ML/M2 (ref 16–34)
ECHO LA VOLUME INDEX A4C: 33 ML/M2 (ref 16–34)
ECHO LV E' LATERAL VELOCITY: 7 CM/S
ECHO LV E' SEPTAL VELOCITY: 7 CM/S
ECHO LV EDV A2C: 114 ML
ECHO LV EDV A4C: 180 ML
ECHO LV EDV INDEX A4C: 83 ML/M2
ECHO LV EDV NDEX A2C: 52 ML/M2
ECHO LV EJECTION FRACTION A2C: 61 %
ECHO LV EJECTION FRACTION A4C: 62 %
ECHO LV EJECTION FRACTION BIPLANE: 61 % (ref 55–100)
ECHO LV ESV A2C: 45 ML
ECHO LV ESV A4C: 69 ML
ECHO LV ESV INDEX A2C: 21 ML/M2
ECHO LV ESV INDEX A4C: 32 ML/M2
ECHO LV FRACTIONAL SHORTENING: 34 % (ref 28–44)
ECHO LV INTERNAL DIMENSION DIASTOLE INDEX: 2.02 CM/M2
ECHO LV INTERNAL DIMENSION DIASTOLIC: 4.4 CM (ref 4.2–5.9)
ECHO LV INTERNAL DIMENSION SYSTOLIC INDEX: 1.33 CM/M2
ECHO LV INTERNAL DIMENSION SYSTOLIC: 2.9 CM
ECHO LV IVSD: 1.2 CM (ref 0.6–1)
ECHO LV MASS 2D: 215.1 G (ref 88–224)
ECHO LV MASS INDEX 2D: 98.7 G/M2 (ref 49–115)
ECHO LV POSTERIOR WALL DIASTOLIC: 1.4 CM (ref 0.6–1)
ECHO LV RELATIVE WALL THICKNESS RATIO: 0.64
ECHO LVOT AREA: 4.5 CM2
ECHO LVOT AV VTI INDEX: 0.85
ECHO LVOT DIAM: 2.4 CM
ECHO LVOT MEAN GRADIENT: 1 MMHG
ECHO LVOT PEAK GRADIENT: 3 MMHG
ECHO LVOT PEAK VELOCITY: 0.8 M/S
ECHO LVOT STROKE VOLUME INDEX: 44.8 ML/M2
ECHO LVOT SV: 97.7 ML
ECHO LVOT VTI: 21.6 CM
ECHO MV A VELOCITY: 1.18 M/S
ECHO MV E DECELERATION TIME (DT): 275 MS
ECHO MV E VELOCITY: 0.83 M/S
ECHO MV E/A RATIO: 0.7
ECHO MV E/E' LATERAL: 11.86
ECHO MV E/E' RATIO (AVERAGED): 11.86
ECHO MV E/E' SEPTAL: 11.86
ECHO PV ACCELERATION TIME (AT): 106 MS
ECHO PV MAX VELOCITY: 0.9 M/S
ECHO PV PEAK GRADIENT: 3 MMHG
ECHO RV BASAL DIMENSION: 3.6 CM
ECHO RV FREE WALL PEAK S': 13 CM/S
ECHO RV INTERNAL DIMENSION: 4.5 CM
ECHO RV TAPSE: 2.6 CM (ref 1.7–?)
EKG ATRIAL RATE: 66 BPM
EKG DIAGNOSIS: NORMAL
EKG P AXIS: 65 DEGREES
EKG Q-T INTERVAL: 558 MS
EKG QRS DURATION: 118 MS
EKG QTC CALCULATION (BAZETT): 460 MS
EKG R AXIS: -79 DEGREES
EKG T AXIS: 197 DEGREES
EKG VENTRICULAR RATE: 41 BPM
GLUCOSE BLD STRIP.AUTO-MCNC: 138 MG/DL (ref 65–100)
GLUCOSE BLD STRIP.AUTO-MCNC: 205 MG/DL (ref 65–100)
GLUCOSE BLD STRIP.AUTO-MCNC: 339 MG/DL (ref 65–100)
GLUCOSE SERPL-MCNC: 217 MG/DL (ref 65–100)
HCT VFR BLD AUTO: 43.7 % (ref 41.1–50.3)
HGB BLD-MCNC: 14.2 G/DL (ref 13.6–17.2)
POTASSIUM BLD-SCNC: 3.6 MMOL/L (ref 3.5–5.1)
POTASSIUM SERPL-SCNC: 3.7 MMOL/L (ref 3.5–5.1)
SERVICE CMNT-IMP: ABNORMAL
SODIUM SERPL-SCNC: 138 MMOL/L (ref 133–143)
SPECIMEN EXP DATE BLD: NORMAL

## 2022-12-01 PROCEDURE — G0378 HOSPITAL OBSERVATION PER HR: HCPCS

## 2022-12-01 PROCEDURE — 6360000002 HC RX W HCPCS: Performed by: NURSE ANESTHETIST, CERTIFIED REGISTERED

## 2022-12-01 PROCEDURE — 3600000004 HC SURGERY LEVEL 4 BASE: Performed by: UROLOGY

## 2022-12-01 PROCEDURE — 85014 HEMATOCRIT: CPT

## 2022-12-01 PROCEDURE — 93306 TTE W/DOPPLER COMPLETE: CPT | Performed by: INTERNAL MEDICINE

## 2022-12-01 PROCEDURE — 2709999900 HC NON-CHARGEABLE SUPPLY: Performed by: UROLOGY

## 2022-12-01 PROCEDURE — 84132 ASSAY OF SERUM POTASSIUM: CPT

## 2022-12-01 PROCEDURE — 82962 GLUCOSE BLOOD TEST: CPT

## 2022-12-01 PROCEDURE — 6370000000 HC RX 637 (ALT 250 FOR IP): Performed by: UROLOGY

## 2022-12-01 PROCEDURE — 3600000014 HC SURGERY LEVEL 4 ADDTL 15MIN: Performed by: UROLOGY

## 2022-12-01 PROCEDURE — 6360000002 HC RX W HCPCS: Performed by: UROLOGY

## 2022-12-01 PROCEDURE — 2500000003 HC RX 250 WO HCPCS: Performed by: NURSE ANESTHETIST, CERTIFIED REGISTERED

## 2022-12-01 PROCEDURE — 2720000010 HC SURG SUPPLY STERILE: Performed by: UROLOGY

## 2022-12-01 PROCEDURE — 93306 TTE W/DOPPLER COMPLETE: CPT

## 2022-12-01 PROCEDURE — 3700000001 HC ADD 15 MINUTES (ANESTHESIA): Performed by: UROLOGY

## 2022-12-01 PROCEDURE — 88305 TISSUE EXAM BY PATHOLOGIST: CPT

## 2022-12-01 PROCEDURE — 93005 ELECTROCARDIOGRAM TRACING: CPT | Performed by: STUDENT IN AN ORGANIZED HEALTH CARE EDUCATION/TRAINING PROGRAM

## 2022-12-01 PROCEDURE — 3700000000 HC ANESTHESIA ATTENDED CARE: Performed by: UROLOGY

## 2022-12-01 PROCEDURE — 52601 PROSTATECTOMY (TURP): CPT | Performed by: UROLOGY

## 2022-12-01 PROCEDURE — 86900 BLOOD TYPING SEROLOGIC ABO: CPT

## 2022-12-01 PROCEDURE — 7100000001 HC PACU RECOVERY - ADDTL 15 MIN: Performed by: UROLOGY

## 2022-12-01 PROCEDURE — 2580000003 HC RX 258: Performed by: NURSE ANESTHETIST, CERTIFIED REGISTERED

## 2022-12-01 PROCEDURE — 2580000003 HC RX 258: Performed by: UROLOGY

## 2022-12-01 PROCEDURE — 2580000003 HC RX 258: Performed by: STUDENT IN AN ORGANIZED HEALTH CARE EDUCATION/TRAINING PROGRAM

## 2022-12-01 PROCEDURE — 7100000000 HC PACU RECOVERY - FIRST 15 MIN: Performed by: UROLOGY

## 2022-12-01 RX ORDER — SODIUM CHLORIDE 0.9 % (FLUSH) 0.9 %
5-40 SYRINGE (ML) INJECTION PRN
Status: DISCONTINUED | OUTPATIENT
Start: 2022-12-01 | End: 2022-12-01 | Stop reason: HOSPADM

## 2022-12-01 RX ORDER — LIDOCAINE HYDROCHLORIDE 20 MG/ML
INJECTION, SOLUTION EPIDURAL; INFILTRATION; INTRACAUDAL; PERINEURAL PRN
Status: DISCONTINUED | OUTPATIENT
Start: 2022-12-01 | End: 2022-12-01 | Stop reason: SDUPTHER

## 2022-12-01 RX ORDER — FINASTERIDE 5 MG/1
5 TABLET, FILM COATED ORAL DAILY
Status: DISCONTINUED | OUTPATIENT
Start: 2022-12-01 | End: 2022-12-04 | Stop reason: HOSPADM

## 2022-12-01 RX ORDER — LABETALOL HYDROCHLORIDE 5 MG/ML
10 INJECTION, SOLUTION INTRAVENOUS
Status: DISCONTINUED | OUTPATIENT
Start: 2022-12-01 | End: 2022-12-01 | Stop reason: HOSPADM

## 2022-12-01 RX ORDER — LATANOPROST 50 UG/ML
1 SOLUTION/ DROPS OPHTHALMIC NIGHTLY
Status: DISCONTINUED | OUTPATIENT
Start: 2022-12-01 | End: 2022-12-04 | Stop reason: HOSPADM

## 2022-12-01 RX ORDER — GLIPIZIDE AND METFORMIN HCL 5; 500 MG/1; MG/1
1 TABLET, FILM COATED ORAL
Status: DISCONTINUED | OUTPATIENT
Start: 2022-12-01 | End: 2022-12-01

## 2022-12-01 RX ORDER — ATORVASTATIN CALCIUM 20 MG/1
20 TABLET, FILM COATED ORAL NIGHTLY
Status: DISCONTINUED | OUTPATIENT
Start: 2022-12-02 | End: 2022-12-04 | Stop reason: HOSPADM

## 2022-12-01 RX ORDER — HYDROMORPHONE HYDROCHLORIDE 1 MG/ML
0.5 INJECTION, SOLUTION INTRAMUSCULAR; INTRAVENOUS; SUBCUTANEOUS
Status: DISCONTINUED | OUTPATIENT
Start: 2022-12-01 | End: 2022-12-04 | Stop reason: HOSPADM

## 2022-12-01 RX ORDER — LIDOCAINE HYDROCHLORIDE 10 MG/ML
1 INJECTION, SOLUTION INFILTRATION; PERINEURAL
Status: DISCONTINUED | OUTPATIENT
Start: 2022-12-01 | End: 2022-12-01 | Stop reason: HOSPADM

## 2022-12-01 RX ORDER — SODIUM CHLORIDE 0.9 % (FLUSH) 0.9 %
5-40 SYRINGE (ML) INJECTION EVERY 12 HOURS SCHEDULED
Status: DISCONTINUED | OUTPATIENT
Start: 2022-12-01 | End: 2022-12-01 | Stop reason: HOSPADM

## 2022-12-01 RX ORDER — FENTANYL CITRATE 50 UG/ML
25 INJECTION, SOLUTION INTRAMUSCULAR; INTRAVENOUS
Status: DISCONTINUED | OUTPATIENT
Start: 2022-12-01 | End: 2022-12-01 | Stop reason: HOSPADM

## 2022-12-01 RX ORDER — SODIUM CHLORIDE, SODIUM LACTATE, POTASSIUM CHLORIDE, CALCIUM CHLORIDE 600; 310; 30; 20 MG/100ML; MG/100ML; MG/100ML; MG/100ML
INJECTION, SOLUTION INTRAVENOUS CONTINUOUS
Status: DISCONTINUED | OUTPATIENT
Start: 2022-12-01 | End: 2022-12-01 | Stop reason: HOSPADM

## 2022-12-01 RX ORDER — HYDRALAZINE HYDROCHLORIDE 20 MG/ML
10 INJECTION INTRAMUSCULAR; INTRAVENOUS
Status: DISCONTINUED | OUTPATIENT
Start: 2022-12-01 | End: 2022-12-01 | Stop reason: HOSPADM

## 2022-12-01 RX ORDER — MIDAZOLAM HYDROCHLORIDE 2 MG/2ML
2 INJECTION, SOLUTION INTRAMUSCULAR; INTRAVENOUS
Status: DISCONTINUED | OUTPATIENT
Start: 2022-12-01 | End: 2022-12-01 | Stop reason: HOSPADM

## 2022-12-01 RX ORDER — SODIUM CHLORIDE 9 MG/ML
INJECTION, SOLUTION INTRAVENOUS CONTINUOUS
Status: DISCONTINUED | OUTPATIENT
Start: 2022-12-01 | End: 2022-12-03

## 2022-12-01 RX ORDER — ONDANSETRON 2 MG/ML
4 INJECTION INTRAMUSCULAR; INTRAVENOUS EVERY 6 HOURS PRN
Status: DISCONTINUED | OUTPATIENT
Start: 2022-12-01 | End: 2022-12-04 | Stop reason: HOSPADM

## 2022-12-01 RX ORDER — GLYCOPYRROLATE 0.2 MG/ML
INJECTION INTRAMUSCULAR; INTRAVENOUS PRN
Status: DISCONTINUED | OUTPATIENT
Start: 2022-12-01 | End: 2022-12-01 | Stop reason: SDUPTHER

## 2022-12-01 RX ORDER — HALOPERIDOL 5 MG/ML
1 INJECTION INTRAMUSCULAR
Status: DISCONTINUED | OUTPATIENT
Start: 2022-12-01 | End: 2022-12-01 | Stop reason: HOSPADM

## 2022-12-01 RX ORDER — DIPHENHYDRAMINE HYDROCHLORIDE 50 MG/ML
12.5 INJECTION INTRAMUSCULAR; INTRAVENOUS
Status: DISCONTINUED | OUTPATIENT
Start: 2022-12-01 | End: 2022-12-01 | Stop reason: HOSPADM

## 2022-12-01 RX ORDER — ONDANSETRON 2 MG/ML
INJECTION INTRAMUSCULAR; INTRAVENOUS PRN
Status: DISCONTINUED | OUTPATIENT
Start: 2022-12-01 | End: 2022-12-01 | Stop reason: SDUPTHER

## 2022-12-01 RX ORDER — OXYCODONE HYDROCHLORIDE 5 MG/1
10 TABLET ORAL PRN
Status: DISCONTINUED | OUTPATIENT
Start: 2022-12-01 | End: 2022-12-01 | Stop reason: HOSPADM

## 2022-12-01 RX ORDER — OXYCODONE HYDROCHLORIDE 5 MG/1
5 TABLET ORAL EVERY 4 HOURS PRN
Status: DISCONTINUED | OUTPATIENT
Start: 2022-12-01 | End: 2022-12-04 | Stop reason: HOSPADM

## 2022-12-01 RX ORDER — DEXAMETHASONE SODIUM PHOSPHATE 4 MG/ML
INJECTION, SOLUTION INTRA-ARTICULAR; INTRALESIONAL; INTRAMUSCULAR; INTRAVENOUS; SOFT TISSUE PRN
Status: DISCONTINUED | OUTPATIENT
Start: 2022-12-01 | End: 2022-12-01 | Stop reason: SDUPTHER

## 2022-12-01 RX ORDER — ATROPA BELLADONNA AND OPIUM 16.2; 3 MG/1; MG/1
30 SUPPOSITORY RECTAL EVERY 6 HOURS PRN
Status: DISCONTINUED | OUTPATIENT
Start: 2022-12-01 | End: 2022-12-01 | Stop reason: RX

## 2022-12-01 RX ORDER — EPHEDRINE SULFATE/0.9% NACL/PF 50 MG/5 ML
SYRINGE (ML) INTRAVENOUS PRN
Status: DISCONTINUED | OUTPATIENT
Start: 2022-12-01 | End: 2022-12-01 | Stop reason: SDUPTHER

## 2022-12-01 RX ORDER — FENTANYL CITRATE 50 UG/ML
100 INJECTION, SOLUTION INTRAMUSCULAR; INTRAVENOUS
Status: DISCONTINUED | OUTPATIENT
Start: 2022-12-01 | End: 2022-12-01 | Stop reason: HOSPADM

## 2022-12-01 RX ORDER — SODIUM CHLORIDE 0.9 % (FLUSH) 0.9 %
5-40 SYRINGE (ML) INJECTION PRN
Status: DISCONTINUED | OUTPATIENT
Start: 2022-12-01 | End: 2022-12-04 | Stop reason: HOSPADM

## 2022-12-01 RX ORDER — HYDROCHLOROTHIAZIDE 25 MG/1
37.5 TABLET ORAL DAILY
Status: DISCONTINUED | OUTPATIENT
Start: 2022-12-01 | End: 2022-12-04 | Stop reason: HOSPADM

## 2022-12-01 RX ORDER — NEOSTIGMINE METHYLSULFATE 1 MG/ML
INJECTION, SOLUTION INTRAVENOUS PRN
Status: DISCONTINUED | OUTPATIENT
Start: 2022-12-01 | End: 2022-12-01 | Stop reason: SDUPTHER

## 2022-12-01 RX ORDER — FENTANYL CITRATE 50 UG/ML
INJECTION, SOLUTION INTRAMUSCULAR; INTRAVENOUS PRN
Status: DISCONTINUED | OUTPATIENT
Start: 2022-12-01 | End: 2022-12-01 | Stop reason: SDUPTHER

## 2022-12-01 RX ORDER — ROCURONIUM BROMIDE 10 MG/ML
INJECTION, SOLUTION INTRAVENOUS PRN
Status: DISCONTINUED | OUTPATIENT
Start: 2022-12-01 | End: 2022-12-01 | Stop reason: SDUPTHER

## 2022-12-01 RX ORDER — ACETAMINOPHEN 325 MG/1
650 TABLET ORAL EVERY 4 HOURS PRN
Status: DISCONTINUED | OUTPATIENT
Start: 2022-12-01 | End: 2022-12-04 | Stop reason: HOSPADM

## 2022-12-01 RX ORDER — DEXTROSE MONOHYDRATE 100 MG/ML
INJECTION, SOLUTION INTRAVENOUS CONTINUOUS PRN
Status: DISCONTINUED | OUTPATIENT
Start: 2022-12-01 | End: 2022-12-04 | Stop reason: HOSPADM

## 2022-12-01 RX ORDER — PROCHLORPERAZINE EDISYLATE 5 MG/ML
5 INJECTION INTRAMUSCULAR; INTRAVENOUS
Status: DISCONTINUED | OUTPATIENT
Start: 2022-12-01 | End: 2022-12-01 | Stop reason: HOSPADM

## 2022-12-01 RX ORDER — OXYCODONE HYDROCHLORIDE 5 MG/1
5 TABLET ORAL PRN
Status: DISCONTINUED | OUTPATIENT
Start: 2022-12-01 | End: 2022-12-01 | Stop reason: HOSPADM

## 2022-12-01 RX ORDER — HYDROMORPHONE HCL 110MG/55ML
0.5 PATIENT CONTROLLED ANALGESIA SYRINGE INTRAVENOUS EVERY 5 MIN PRN
Status: DISCONTINUED | OUTPATIENT
Start: 2022-12-01 | End: 2022-12-01 | Stop reason: HOSPADM

## 2022-12-01 RX ORDER — AMLODIPINE BESYLATE 5 MG/1
5 TABLET ORAL DAILY
Status: DISCONTINUED | OUTPATIENT
Start: 2022-12-01 | End: 2022-12-04 | Stop reason: HOSPADM

## 2022-12-01 RX ORDER — SODIUM CHLORIDE 0.9 % (FLUSH) 0.9 %
5-40 SYRINGE (ML) INJECTION EVERY 12 HOURS SCHEDULED
Status: DISCONTINUED | OUTPATIENT
Start: 2022-12-01 | End: 2022-12-04 | Stop reason: HOSPADM

## 2022-12-01 RX ORDER — PROPOFOL 10 MG/ML
INJECTION, EMULSION INTRAVENOUS PRN
Status: DISCONTINUED | OUTPATIENT
Start: 2022-12-01 | End: 2022-12-01 | Stop reason: SDUPTHER

## 2022-12-01 RX ORDER — SODIUM CHLORIDE 9 MG/ML
INJECTION, SOLUTION INTRAVENOUS PRN
Status: DISCONTINUED | OUTPATIENT
Start: 2022-12-01 | End: 2022-12-01 | Stop reason: HOSPADM

## 2022-12-01 RX ORDER — IPRATROPIUM BROMIDE AND ALBUTEROL SULFATE 2.5; .5 MG/3ML; MG/3ML
1 SOLUTION RESPIRATORY (INHALATION)
Status: DISCONTINUED | OUTPATIENT
Start: 2022-12-01 | End: 2022-12-01 | Stop reason: HOSPADM

## 2022-12-01 RX ORDER — LOSARTAN POTASSIUM 50 MG/1
100 TABLET ORAL DAILY
Status: DISCONTINUED | OUTPATIENT
Start: 2022-12-02 | End: 2022-12-04 | Stop reason: HOSPADM

## 2022-12-01 RX ORDER — SODIUM CHLORIDE 9 MG/ML
INJECTION, SOLUTION INTRAVENOUS PRN
Status: DISCONTINUED | OUTPATIENT
Start: 2022-12-01 | End: 2022-12-02 | Stop reason: SDUPTHER

## 2022-12-01 RX ORDER — LEVOTHYROXINE SODIUM 0.12 MG/1
125 TABLET ORAL DAILY
Status: DISCONTINUED | OUTPATIENT
Start: 2022-12-02 | End: 2022-12-04 | Stop reason: HOSPADM

## 2022-12-01 RX ORDER — GLIPIZIDE 5 MG/1
5 TABLET ORAL
Status: DISCONTINUED | OUTPATIENT
Start: 2022-12-01 | End: 2022-12-04 | Stop reason: HOSPADM

## 2022-12-01 RX ADMIN — Medication 2 G: at 11:26

## 2022-12-01 RX ADMIN — LIDOCAINE HYDROCHLORIDE 80 MG: 20 INJECTION, SOLUTION EPIDURAL; INFILTRATION; INTRACAUDAL; PERINEURAL at 11:18

## 2022-12-01 RX ADMIN — PROPOFOL 140 MG: 10 INJECTION, EMULSION INTRAVENOUS at 11:18

## 2022-12-01 RX ADMIN — SODIUM CHLORIDE, POTASSIUM CHLORIDE, SODIUM LACTATE AND CALCIUM CHLORIDE: 600; 310; 30; 20 INJECTION, SOLUTION INTRAVENOUS at 08:54

## 2022-12-01 RX ADMIN — ONDANSETRON 4 MG: 2 INJECTION INTRAMUSCULAR; INTRAVENOUS at 11:30

## 2022-12-01 RX ADMIN — SODIUM CHLORIDE, PRESERVATIVE FREE 10 ML: 5 INJECTION INTRAVENOUS at 20:19

## 2022-12-01 RX ADMIN — Medication 6 MG: at 12:11

## 2022-12-01 RX ADMIN — DEXAMETHASONE SODIUM PHOSPHATE 4 MG: 4 INJECTION, SOLUTION INTRAMUSCULAR; INTRAVENOUS at 11:30

## 2022-12-01 RX ADMIN — LATANOPROST 1 DROP: 50 SOLUTION OPHTHALMIC at 20:19

## 2022-12-01 RX ADMIN — HYDROCHLOROTHIAZIDE 37.5 MG: 25 TABLET ORAL at 16:27

## 2022-12-01 RX ADMIN — Medication 4 MG: at 12:05

## 2022-12-01 RX ADMIN — Medication 5 MG: at 11:43

## 2022-12-01 RX ADMIN — ROCURONIUM BROMIDE 10 MG: 50 INJECTION, SOLUTION INTRAVENOUS at 11:54

## 2022-12-01 RX ADMIN — ACETAMINOPHEN 650 MG: 325 TABLET ORAL at 16:33

## 2022-12-01 RX ADMIN — FENTANYL CITRATE 50 MCG: 50 INJECTION, SOLUTION INTRAMUSCULAR; INTRAVENOUS at 12:17

## 2022-12-01 RX ADMIN — SODIUM CHLORIDE, PRESERVATIVE FREE 10 ML: 5 INJECTION INTRAVENOUS at 16:27

## 2022-12-01 RX ADMIN — ROCURONIUM BROMIDE 40 MG: 50 INJECTION, SOLUTION INTRAVENOUS at 11:19

## 2022-12-01 RX ADMIN — PHENYLEPHRINE HYDROCHLORIDE 50 MCG: 10 INJECTION INTRAVENOUS at 11:36

## 2022-12-01 RX ADMIN — AMLODIPINE BESYLATE 5 MG: 5 TABLET ORAL at 16:27

## 2022-12-01 RX ADMIN — FENTANYL CITRATE 50 MCG: 50 INJECTION, SOLUTION INTRAMUSCULAR; INTRAVENOUS at 12:23

## 2022-12-01 RX ADMIN — Medication 5 MG: at 11:22

## 2022-12-01 RX ADMIN — Medication 4 MG: at 13:07

## 2022-12-01 RX ADMIN — FINASTERIDE 5 MG: 5 TABLET, FILM COATED ORAL at 16:27

## 2022-12-01 RX ADMIN — SODIUM CHLORIDE: 9 INJECTION, SOLUTION INTRAVENOUS at 16:56

## 2022-12-01 RX ADMIN — GLIPIZIDE 5 MG: 5 TABLET ORAL at 16:27

## 2022-12-01 RX ADMIN — FENTANYL CITRATE 100 MCG: 50 INJECTION, SOLUTION INTRAMUSCULAR; INTRAVENOUS at 11:18

## 2022-12-01 RX ADMIN — GLYCOPYRROLATE 0.6 MG: 0.2 INJECTION, SOLUTION INTRAMUSCULAR; INTRAVENOUS at 13:07

## 2022-12-01 RX ADMIN — PROPOFOL 20 MG: 10 INJECTION, EMULSION INTRAVENOUS at 12:22

## 2022-12-01 RX ADMIN — PHENYLEPHRINE HYDROCHLORIDE 50 MCG: 10 INJECTION INTRAVENOUS at 12:11

## 2022-12-01 RX ADMIN — METFORMIN HYDROCHLORIDE 500 MG: 500 TABLET ORAL at 16:27

## 2022-12-01 ASSESSMENT — PAIN - FUNCTIONAL ASSESSMENT
PAIN_FUNCTIONAL_ASSESSMENT: NONE - DENIES PAIN
PAIN_FUNCTIONAL_ASSESSMENT: 0-10
PAIN_FUNCTIONAL_ASSESSMENT: NONE - DENIES PAIN

## 2022-12-01 NOTE — ANESTHESIA POSTPROCEDURE EVALUATION
Department of Anesthesiology  Postprocedure Note    Patient: Emory Runner  MRN: [id number]  YOB: 1940  Date of evaluation: 12/1/2022      Procedure Summary     Date: 12/01/22 Room / Location: D MAIN OR 01 CYSTO / [facility] MAIN OR    Anesthesia Start: 1109 Anesthesia Stop: 1353    Procedure: CYSTOSCOPY MONOPOLAR TRANSURETHRAL RESECTION PROSTATE (Urethra) Diagnosis:       Enlarged prostate with lower urinary tract symptoms (LUTS)      (Enlarged prostate with lower urinary tract symptoms (LUTS) [N40.1])    Providers: Elis Casarez MD Responsible Provider: Jorge Bagley MD    Anesthesia Type: General ASA Status: 3          Anesthesia Type: General    Stephanie Phase I: Stephanie Score: 9    Stephanie Phase II:        Anesthesia Post Evaluation    Patient location during evaluation: bedside  Patient participation: complete - patient participated  Level of consciousness: awake and alert  Pain score: 1  Airway patency: patent  Nausea & Vomiting: no vomiting  Complications: no  Cardiovascular status: hemodynamically stable  Respiratory status: acceptable  Hydration status: euvolemic  Comments: Patient appeared to be in mobitz type I intraop. He remained hemodynamically stable throughout case. I obtained a 12 lead ekg in recovery which showed complete heart block. I went and spoke with EP physician in person and then cardiology was consulted. Patient was planned admission but will now be sent to tele floor. Possibility of pacemaker tomorrow. I discussed both with cardiology and EP team and patient.

## 2022-12-01 NOTE — ANESTHESIA PRE PROCEDURE
Department of Anesthesiology  Preprocedure Note       Name:  Ashu Officer   Age:  81 y.o.  :  1940                                          MRN:  [id number]         Date:  2022      Surgeon: Maryuri Trammell):  Justin Chávez MD    Procedure: Procedure(s):  CYSTOSCOPY MONOPOLAR TRANSURETHRAL RESECTION PROSTATE    Medications prior to admission:   Prior to Admission medications    Medication Sig Start Date End Date Taking? Authorizing Provider   tamsulosin (FLOMAX) 0.4 MG capsule Take 0.4 mg by mouth at bedtime   Yes Historical Provider, MD   acetaminophen (TYLENOL) 500 MG tablet Take 500 mg by mouth every 6 hours as needed for Pain   Yes Historical Provider, MD   amLODIPine (NORVASC) 5 MG tablet TAKE 1 TABLET BY MOUTH EVERY DAY 22   Quynh Mcintosh MD   atorvastatin (LIPITOR) 20 MG tablet Take 20 mg by mouth daily 22   Ar Automatic Reconciliation   bimatoprost (LUMIGAN) 0.01 % SOLN ophthalmic drops Place 1 drop into both eyes nightly 4/30/15   Ar Automatic Reconciliation   finasteride (PROSCAR) 5 MG tablet Take 5 mg by mouth daily 22   Ar Automatic Reconciliation   glipiZIDE-metFORMIN (METAGLIP) 5-500 MG per tablet 2 in the AM and 1 at [facility] 22   Ar Automatic Reconciliation   hydroCHLOROthiazide (HYDRODIURIL) 25 MG tablet Take 37.5 mg by mouth daily 1.5 tablet every am 22   Ar Automatic Reconciliation   ketoconazole (NIZORAL) 2 % cream Apply topically as needed 21   Ar Automatic Reconciliation   levothyroxine (SYNTHROID) 125 MCG tablet TAKE 2 TABS BY MOUTH DAILY (BEFORE BREAKFAST).  USES 250MG 4 DAYS 125 NEXT 3DAYS (FRI, SAT, SUN) 22   Ar Automatic Reconciliation   losartan (COZAAR) 100 MG tablet Take 100 mg by mouth daily 22   Ar Automatic Reconciliation       Current medications:    Current Facility-Administered Medications   Medication Dose Route Frequency Provider Last Rate Last Admin    lidocaine 1 % injection 1 mL  1 mL IntraDERmal Once PRN MD Ho Soriano fentaNYL (SUBLIMAZE) injection 25 mcg  25 mcg IntraVENous Once PRN Susan Tovar MD        Or    fentaNYL (SUBLIMAZE) injection 100 mcg  100 mcg IntraVENous Once PRN Susan Tovar MD        lactated ringers infusion   IntraVENous Continuous Susan Tovar  mL/hr at 12/01/22 0854 New Bag at 12/01/22 0854    sodium chloride flush 0.9 % injection 5-40 mL  5-40 mL IntraVENous 2 times per day Susan Tovar MD        sodium chloride flush 0.9 % injection 5-40 mL  5-40 mL IntraVENous PRN Susan Tovar MD        0.9 % sodium chloride infusion   IntraVENous PRN Susan Tovar MD        midazolam PF (VERSED) injection 2 mg  2 mg IntraVENous Once PRN Susan Tovar MD        ceFAZolin (ANCEF) 2000 mg in sterile water 20 mL IV syringe  2,000 mg IntraVENous On Call to [facility] MD           Allergies:  No Known Allergies    Problem List:    Patient Active Problem List   Diagnosis Code    Benign prostatic hyperplasia with urinary obstruction and other lower urinary tract symptoms N40.1, N13.8    Elevated PSA R97.20    Acquired hypothyroidism E03.9    Urinary retention R33.9    Osteoarthritis of multiple joints M15.9    Hematuria R31.9    Constipation K59.00    Controlled type 2 diabetes mellitus with diabetic neuropathy, without long-term current use of insulin (HCC) E11.40    S/P total knee replacement Z96.659    Hypercholesterolemia E78.00    Essential hypertension I10       Past Medical History:        Diagnosis Date    Chronic pain     arthritic    Enlarged prostate     \"improved since TURP\"    Enlarged prostate with lower urinary tract symptoms (LUTS)     Former cigarette smoker     Hypercholesteremia     Hypertension     well controlled by valsartan/hctz    Hypothyroidism          Obesity (BMI 30.0-34. 9)     BMI 31    Osteoarthritis          Type 2 diabetes mellitus (HCC)     oral agent only/AVG BS:110/no s.s of hypoglycemia/Last A1c: 7.7    Vitamin B 12 deficiency     Vitamin D deficiency        Past Surgical History:        Procedure Laterality Date    CHOLECYSTECTOMY, LAPAROSCOPIC  2007    COLONOSCOPY  2017    OTHER SURGICAL HISTORY      prostate laser surgery \"before the TURP\"    TOTAL KNEE ARTHROPLASTY Right 2015    TURP  2013       Social History:    Social History     Tobacco Use    Smoking status: Former     Packs/day: 0.50     Years: 15.00     Pack years: 7.50     Types: Cigarettes     Quit date: 1988     Years since quittin.5    Smokeless tobacco: Never   Substance Use Topics    Alcohol use: No                                Counseling given: Not Answered      Vital Signs (Current):   Vitals:    22 1103 22 0836   BP:  (!) 197/94   Pulse:  59   Resp:  16   Temp:  97.7 °F (36.5 °C)   TempSrc:  Oral   SpO2:  98%   Weight: 226 lb (102.5 kg) 217 lb (98.4 kg)   Height: 5' 11\" (1.803 m) 5' 11\" (1.803 m)                                              BP Readings from Last 3 Encounters:   22 (!) 197/94   08/10/22 128/68   22 134/70       NPO Status: Time of last liquid consumption:                         Time of last solid consumption:                         Date of last liquid consumption: 22                        Date of last solid food consumption: 22    BMI:   Wt Readings from Last 3 Encounters:   22 217 lb (98.4 kg)   08/10/22 218 lb (98.9 kg)   22 218 lb 8 oz (99.1 kg)     Body mass index is 30.27 kg/m².     CBC:   Lab Results   Component Value Date/Time    WBC 3.5 2021 09:00 AM    RBC 5.02 2021 09:00 AM    HGB 15.7 2021 09:00 AM    HCT 45.3 2021 09:00 AM    MCV 90 2021 09:00 AM    RDW 13.3 2021 09:00 AM     2021 09:00 AM       CMP:   Lab Results   Component Value Date/Time     2022 03:55 PM    K 3.6 2022 03:55 PM     2022 03:55 PM    CO2 23 2022 03:55 PM    BUN 19 2022 03:55 PM    CREATININE 1.19 2022 09:00 AM    CREATININE 1.40 08/12/2022 03:55 PM    GFRAA >60 08/12/2022 03:55 PM    AGRATIO 2.0 04/07/2022 02:34 PM    LABGLOM 52 08/12/2022 03:55 PM    GLUCOSE 133 08/12/2022 03:55 PM    PROT 6.8 04/07/2022 02:34 PM    CALCIUM 8.8 08/12/2022 03:55 PM    BILITOT 1.6 04/07/2022 02:34 PM    ALKPHOS 88 04/07/2022 02:34 PM    AST 27 04/07/2022 02:34 PM    ALT 30 04/07/2022 02:34 PM       POC Tests:   Recent Labs     12/01/22  0852 12/01/22  0900   POCGLU 138*  --    POCK  --  3.6       Coags: No results found for: PROTIME, INR, APTT    HCG (If Applicable): No results found for: PREGTESTUR, PREGSERUM, HCG, HCGQUANT     ABGs: No results found for: PHART, PO2ART, BZH9CMT, EEW7WQA, BEART, K0NCPDOI     Type & Screen (If Applicable):  No results found for: LABABO, LABRH    Drug/Infectious Status (If Applicable):  No results found for: HIV, HEPCAB    COVID-19 Screening (If Applicable): No results found for: COVID19        Anesthesia Evaluation  Patient summary reviewed and Nursing notes reviewed no history of anesthetic complications:   Airway: Mallampati: II  TM distance: >3 FB   Neck ROM: full  Mouth opening: > = 3 FB   Dental:    (+) upper dentures and lower dentures      Pulmonary:normal exam        Smoker: former. Cardiovascular:  Exercise tolerance: good (>4 METS),   (+) hypertension:, hyperlipidemia                  Neuro/Psych:   Negative Neuro/Psych ROS              GI/Hepatic/Renal: Neg GI/Hepatic/Renal ROS            Endo/Other:    (+) DiabetesType II DM, poorly controlled, , hypothyroidism: arthritis:., .                 Abdominal:             Vascular: negative vascular ROS. Other Findings:           Anesthesia Plan      general     ASA 3       Induction: intravenous. Anesthetic plan and risks discussed with patient.                         Lauryn Brito MD   12/1/2022

## 2022-12-01 NOTE — PLAN OF CARE
Problem: Chronic Conditions and Co-morbidities  Goal: Patient's chronic conditions and co-morbidity symptoms are monitored and maintained or improved  Outcome: Progressing  Flowsheets (Taken 12/1/2022 1600)  Care Plan - Patient's Chronic Conditions and Co-Morbidity Symptoms are Monitored and Maintained or Improved:   Monitor and assess patient's chronic conditions and comorbid symptoms for stability, deterioration, or improvement   Collaborate with multidisciplinary team to address chronic and comorbid conditions and prevent exacerbation or deterioration     Problem: Discharge Planning  Goal: Discharge to home or other facility with appropriate resources  Outcome: Progressing  Flowsheets (Taken 12/1/2022 1600)  Discharge to home or other facility with appropriate resources:   Identify barriers to discharge with patient and caregiver   Arrange for needed discharge resources and transportation as appropriate   Identify discharge learning needs (meds, wound care, etc)

## 2022-12-01 NOTE — ANESTHESIA PROCEDURE NOTES
Airway  Date/Time: 12/1/2022 11:23 AM  Urgency: elective    Airway not difficult    General Information and Staff    Patient location during procedure: OR  Resident/CRNA: VERN Hobson CRNA  Performed: resident/CRNA     Indications and Patient Condition  Indications for airway management: anesthesia  Spontaneous Ventilation: absent  Sedation level: deep  Preoxygenated: yes  Patient position: sniffing  MILS not maintained throughout  Mask difficulty assessment: vent by bag mask + OA or adjuvant +/- NMBA    Final Airway Details  Final airway type: endotracheal airway      Successful airway: ETT  Cuffed: yes   Successful intubation technique: direct laryngoscopy  Facilitating devices/methods: intubating stylet  Endotracheal tube insertion site: oral  Blade: Jojo  Blade size: #4  ETT size (mm): 8.0  Cormack-Lehane Classification: grade IIa - partial view of glottis  Placement verified by: chest auscultation and capnometry   Measured from: lips  ETT to lips (cm): 24  Number of attempts at approach: 1  Ventilation between attempts: bag mask  Number of other approaches attempted: 0    no

## 2022-12-01 NOTE — Clinical Note
Prepped: left chest. Site was marked, clipped and prepped. Prepped with: ChloraPrep. Patient was draped after wet prep solution dried.

## 2022-12-01 NOTE — BRIEF OP NOTE
Brief Postoperative Note      Patient: Hailey Ruiz  YOB: 1940  MRN: [id number]    Date of Procedure: 12/1/2022    Pre-Op Diagnosis: Enlarged prostate with lower urinary tract symptoms (LUTS) [N40.1]    Post-Op Diagnosis: Same       Procedure(s):  CYSTOSCOPY MONOPOLAR TRANSURETHRAL RESECTION PROSTATE    Surgeon(s):  Pari Nunez MD    Assistant:  * No surgical staff found *    Anesthesia: General    Estimated Blood Loss (mL): 149     Complications: None    Specimens:   * No specimens in log *    Implants:  * No implants in log *      Drains: * No LDAs found *    Findings: see op note    Electronically signed by Domenica Blanco MD on 12/1/2022 at 1:35 PM

## 2022-12-01 NOTE — H&P
[facility] Cardiology Initial Cardiac Evaluation      Date of  Admission: 12/1/2022  8:14 AM     Primary Care Physician: Taylor Agarwal MD  Primary Cardiologist: None  Referring Physician: Dr Shruthi Ceron Physician: Dr Mayte Wood    CC/Reason for evaluation: complete heart block     HPI:  Candance Peek is a 81 y.o. male with prior history of HTN, hypercholesterolemia, hypothyroidism, former smoker and BPH with LUTS who presented to [facility] for cystoscopy with TURP on 12/1 by Dr Ricardo Wu. In PACU, patient noted to be bradycardic and EKG was performed. EKG showed complete heart block therefore cardiology was consulted. Patient still groggy from sedation. Not on any rate slowing medications. Denies history of cardiac issues. Past Medical History:   Diagnosis Date    Chronic pain     arthritic    Enlarged prostate     \"improved since TURP\"    Enlarged prostate with lower urinary tract symptoms (LUTS)     Former cigarette smoker     Hypercholesteremia     Hypertension     well controlled by valsartan/hctz    Hypothyroidism          Obesity (BMI 30.0-34. 9)     BMI 31    Osteoarthritis          Type 2 diabetes mellitus (HCC)     oral agent only/AVG BS:110/no s.s of hypoglycemia/Last A1c: 7.7    Vitamin B 12 deficiency     Vitamin D deficiency       Past Surgical History:   Procedure Laterality Date    CHOLECYSTECTOMY, LAPAROSCOPIC  2007    COLONOSCOPY  08/09/2017    OTHER SURGICAL HISTORY      prostate laser surgery \"before the TURP\"    TOTAL KNEE ARTHROPLASTY Right 02/2015    TURP  6/2013       No Known Allergies   Social History     Socioeconomic History    Marital status:      Spouse name: Not on file    Number of children: Not on file    Years of education: Not on file    Highest education level: Not on file   Occupational History    Not on file   Tobacco Use    Smoking status: Former     Packs/day: 0.50     Years: 15.00     Pack years: 7.50     Types: Cigarettes     Quit date: 6/2/1988     Years since quittin.5    Smokeless tobacco: Never   Vaping Use    Vaping Use: Never used   Substance and Sexual Activity    Alcohol use: No    Drug use: No    Sexual activity: Not on file   Other Topics Concern    Not on file   Social History Narrative    Not on file     Social Determinants of Health     Financial Resource Strain: Not on file   Food Insecurity: Not on file   Transportation Needs: Not on file   Physical Activity: Not on file   Stress: Not on file   Social Connections: Not on file   Intimate Partner Violence: Not on file   Housing Stability: Not on file     Social History       Tobacco History       Smoking Status  Former Quit Date  1988 Smoking Frequency  0.50 packs/day for 15.00 years (7.50 pk-yrs) Smoking Tobacco Type  Cigarettes quit in 1988      Smokeless Tobacco Use  Never              Alcohol History       Alcohol Use Status  No              Drug Use       Drug Use Status  No              Sexual Activity       Sexually Active  Not Asked                    Family History   Problem Relation Age of Onset    Cancer Mother     Cancer Father     Hypertension Father     Cancer Sister     Hypertension Brother     Stroke Brother     Diabetes Sister     Hypertension Sister     Hypertension Sister     Hypertension Sister     Hypertension Sister     Hypertension Sister     Hypertension Brother     Stroke Brother     Hypertension Brother     Stroke Brother     Hypertension Brother     Stroke Brother         Current Facility-Administered Medications   Medication Dose Route Frequency    HYDROmorphone (DILAUDID) injection 0.5 mg  0.5 mg IntraVENous Q5 Min PRN    oxyCODONE (ROXICODONE) immediate release tablet 5 mg  5 mg Oral PRN    Or    oxyCODONE (ROXICODONE) immediate release tablet 10 mg  10 mg Oral PRN    prochlorperazine (COMPAZINE) injection 5 mg  5 mg IntraVENous Once PRN    haloperidol lactate (HALDOL) injection 1 mg  1 mg IntraVENous Once PRN    diphenhydrAMINE (BENADRYL) injection 12.5 mg  12.5 mg IntraVENous Once PRN    labetalol (NORMODYNE;TRANDATE) injection 10 mg  10 mg IntraVENous Q15 Min PRN    Or    hydrALAZINE (APRESOLINE) injection 10 mg  10 mg IntraVENous Q15 Min PRN    ipratropium-albuterol (DUONEB) nebulizer solution 1 ampule  1 ampule Inhalation Once PRN       Review of Symptoms:  Unable to review secondary to drowsiness from anesthesia      Subjective:     Physical Exam:    Vitals:    12/01/22 1446 12/01/22 1451 12/01/22 1456 12/01/22 1501   BP: (!) 114/54 (!) 110/52 (!) 119/57 (!) 124/55   Pulse: (!) 39 (!) 37 (!) 45 (!) 41   Resp: 15 15 15 15   Temp:       TempSrc:       SpO2: 99% 100% 100% 100%   Weight:       Height:         General: Well Developed, Well Nourished, drowsy   HEENT: pupils equal and round, no abnormalities noted  Neck: supple, no JVD, no carotid bruits  Heart: S1S2 with RRR without murmurs or gallops  Lungs: Clear throughout auscultation bilaterally without adventitious sounds  Abd: soft, nontender, nondistended, with good bowel sounds  Ext: warm, no edema, calves supple/nontender, pulses 2+ bilaterally  Skin: warm and dry  Psychiatric: Normal mood and affect  Neurologic: Alert and oriented X 3    Cardiographics    ECG: complete heart block   Echocardiogram:ordered     Labs:     Recent Labs     12/01/22  1422   HGB 14.2   HCT 43.7     Lab Results   Component Value Date    WBC 3.5 02/02/2021    HGB 14.2 12/01/2022    HCT 43.7 12/01/2022     02/02/2021    CHOL 177 04/07/2022    TRIG 100 04/07/2022    HDL 57 04/07/2022    ALT 30 04/07/2022    AST 27 04/07/2022     12/01/2022    K 3.7 12/01/2022     12/01/2022    CREATININE 1.50 12/01/2022    BUN 21 12/01/2022    CO2 28 12/01/2022    TSH 5.500 (H) 04/07/2022    PSA 5.2 (H) 09/26/2022    LABA1C 7.7 (H) 08/12/2022    LABMICR 3.72 (H) 08/12/2022      Pt has been seen and examined by Dr. Serjio Osei. He agrees with the following assessment and plan.      Assessment/Plan:       Principal Problem:    Benign prostatic hyperplasia with lower urinary tract symptoms  - s/p TURP on 12/1  - per primary       Complete heart block  - not on any rate slowing medications   - stat ECHO   - monitor on telemetry overnight   - NPO after midnight for possible for PPM tomorrow       HTN  - hypotensive in PACU, improved   - PTA meds- amlodipine, losartan, hydrochlorothiazide - hold on admission   - monitor and titrate medication as needed       HLD   - continue statin       Hypothyroidism  - on synthroid      DM   - per primary team       Thank you for requesting cardiac evaluation and allowing us to participate in the care of this patient. We will continue to follow along with you.       Iek Harmon PA-C  Supervising Physician: Dr Mayte Wood

## 2022-12-01 NOTE — Clinical Note
Contrast Dose Calculator:   Patient's age: 81.   Patient's sex: Male. Patient weight (kg) = 103.6. Creatinine level (mg/dL) = 1.3. Creatinine clearance (mL/min): 65.   Contrast concentration (mg/mL) = 370. MACD = 300 mL. Max Contrast dose per Creatinine Cl calculator = 146.25 mL.

## 2022-12-01 NOTE — PROGRESS NOTES
Dual skin assessment completed with 2nd RN Radha Sommers) and reveals the following: sacrum and heels intact. Scattered scratches. Asthon in place with CBI running. Heart monitor applied.

## 2022-12-01 NOTE — H&P
Kerry Sanchez  : 1940           HPI   81 y.o., male   History of BPH, microwave thermotherapy treatment of the prostate in the past. The patient had been taking Avodart chronically. In  started having some gross hematuria, some dysuria. Went to   an  facility and was given an antibiotic. CT was obtained. CT showed  IMPRESSION:   1. Large mass in the bladder with circumferential wall   thickening. Although this may simply represent hematoma or blood   clot, neoplasm must be strongly considered. Recommend correlation   with direct visualization at cystoscopy. 2. A 2 mm nonobstructing right peripelvic renal stone. There is   no hydronephrosis or hydroureter. 3. Lobular hyperplasia of the left adrenal gland. 4. Indeterminate 1.5 x 1.0 cm right iliac lymph node. 5. Indeterminate tiny sclerotic foci about the pelvis may simple   represent bone islands. Cysto in office showed large BPH. Had TURP 14 . path shows 12   gm BPH and chronic prostatitis. Has stopped the Avodart. Is voiding much better, but having some urgency, double voiding,   nocturia x 3. Cysto showed S/p TURP but with large anterior lobe, both   intravesical and partially obstructing the prostate fossa. ,   without bladder neck contracture. We had discussed repeat TURP. He stated that he would rather take meds. He is on Tamsulosin and LUTS are much better. In  had  gross painless hematuria on 3 occasions. Had CT w/wo on 17: IMPRESSION:   1. The only potential etiology for the patient's hematuria is that there is  severe enlargement of the prostate gland which has slightly worsened when  compared to the prior CT study. No evidence for stone disease, enhancing renal  cortical lesion, or focal urinary bladder wall lesion is otherwise seen. Hematuria has resolved. PSA 8.9 in . Cysto in 3-17 showed s/p TURP, huge intravesical prostate lobe anteriorly.  He was started on Finasteride in addition to Tamsulosin. ? Hematuria felt to be  from massive BPH. . If he comes to surgery , may need open prostatectomy. He is voiding well. PSA 5.7 in 9-17. PSA 3.9 in 3-18. Had  microscopic hematuria in 4-18; had negative urine cytology. PSA 4.2 in 9-18. He called on 9-25-18 after having gross hematuria. This stopped, currently without problems. Had Staph UTI in 10-18. PSA 3.2 in 9-19, corrected to 6.4 .   . Remains on tamsulosin and finasteride. Has been on finasteride since 2017. PSA 4.1 in 9-20, corrected to 8.2   Has tried Cialis 5 mg daily with no improvement in ED. ALEX in 10-20 showed 4+ prostate without nodules. PSA 5.4 in 3-21, corrected to 10.8. He is having some urge incontinence. Wears a pad. Slow rise in PSA. Urge incontinence is bothering him. Continues tamsulosin and finasteride. Cysto in 4-21 showed huge intravesical lobe     PVR 8 ml by US  AUASS is 22 , with highest score of 5 for \"urgency\". QOL is \"pleased\". Added Detrol to tamsulosin and finasteride in 4-21. He states that his voiding is not a problem. PVR 52 ml by US today. PSA 5.5, corrected to 11.0. PSA 5.2 in 9-22, corrected to 10.4. He states that he is having urgency and urge incontinence. PVR in 10-22 by US is >120 ml.      CT from 2021 shows huge prostate with large intravesical component. is retaining urine. stopped the Detrol. Conitinue tamsulosin and finasteride. Chart says he's not on Detrol. May need surgery. Will return for TRUS to measure prostate. Past Medical History:   Diagnosis Date    Chronic pain       arthritic    Diabetes ([facility] [address] 2000?      typell, franky blood glucose rarely , last HA1B 5.6    Enlarged prostate       \"improved since TURP\"    Hypercholesteremia      Hypertension       well controlled by valsartan/hctz    Hypothyroidism            Obesity (BMI 30.0-34.9)      Osteoarthritis            Vitamin B 12 deficiency      Vitamin D deficiency Past Surgical History:   Procedure Laterality Date    CHOLECYSTECTOMY, LAPAROSCOPIC       COLONOSCOPY   2017    ORTHOPEDIC SURGERY         Total right knee 2015    OTHER SURGICAL HISTORY         prostate laser surgery \"before the TURP\"    TURP   2013             Current Outpatient Medications   Medication Sig Dispense Refill    amLODIPine (NORVASC) 5 MG tablet TAKE 1 TABLET BY MOUTH EVERY DAY 90 tablet 3    atorvastatin (LIPITOR) 20 MG tablet Take 20 mg by mouth daily        bimatoprost (LUMIGAN) 0.01 % SOLN ophthalmic drops Apply 1 drop to eye        cyanocobalamin 1000 MCG tablet Take 1,000 mcg by mouth daily        finasteride (PROSCAR) 5 MG tablet Take 5 mg by mouth daily        glipiZIDE-metFORMIN (METAGLIP) 5-500 MG per tablet 2 in the AM and 1 at HS        hydroCHLOROthiazide (HYDRODIURIL) 25 MG tablet Take 37.5 mg by mouth daily        ketoconazole (NIZORAL) 2 % cream Apply topically 2 times daily        levothyroxine (SYNTHROID) 125 MCG tablet TAKE 2 TABS BY MOUTH DAILY (BEFORE BREAKFAST). USES 250MG 4 DAYS 125 NEXT 3DAYS (FRI, SAT, SUN)        losartan (COZAAR) 100 MG tablet Take 100 mg by mouth daily        tamsulosin (FLOMAX) 0.4 MG capsule Take 0.4 mg by mouth daily          No current facility-administered medications for this visit.       No Known Allergies  Social History            Socioeconomic History    Marital status:        Spouse name: Not on file    Number of children: Not on file    Years of education: Not on file    Highest education level: Not on file   Occupational History    Not on file   Tobacco Use    Smoking status: Former       Packs/day: 0.50       Types: Cigarettes       Quit date: 1988       Years since quittin.4    Smokeless tobacco: Never   Substance and Sexual Activity    Alcohol use: No    Drug use: No    Sexual activity: Not on file   Other Topics Concern    Not on file   Social History Narrative    Not on file      Social Determinants of Health Financial Resource Strain: Not on file   Food Insecurity: Not on file   Transportation Needs: Not on file   Physical Activity: Not on file   Stress: Not on file   Social Connections: Not on file   Intimate Partner Violence: Not on file   Housing Stability: Not on file            Family History   Problem Relation Age of Onset    Cancer Mother      Cancer Father      Hypertension Father      Cancer Sister      Hypertension Brother      Stroke Brother      Diabetes Sister      Hypertension Sister      Hypertension Sister      Hypertension Sister      Hypertension Sister      Hypertension Sister      Hypertension Brother      Stroke Brother      Hypertension Brother      Stroke Brother      Hypertension Brother      Stroke Brother                    Physical Exam  General   Mental Status - Patient is alert and oriented X3. Build & Nutrition - Well nourished. Chest and Lung Exam   Chest and lung exam reveals  - normal excursion with symmetric chest walls, quiet, even and easy respiratory effort with no use of accessory muscles and on auscultation, normal breath sounds, no adventitious sounds and normal vocal resonance. Cardiovascular   Cardiovascular examination reveals  - normal heart sounds, regular rate and rhythm with no murmurs. Abdomen   Palpation/Percussion: Palpation and Percussion of the abdomen reveal - Non Tender, No Rebound tenderness, No Rigidity (guarding), No hepatosplenomegaly, No Palpable abdominal masses and Soft. Hernia - Bilateral - No Hernia(s) present. TRANSRECTAL ULTRASOUND GUIDED BIOPSY OF THE PROSTATE     All risks, benefits and alternatives were again reviewed and he is willing to proceed at this time. The patient was placed in the left lateral decubitus position and the transrectal ultrasound probe was inserted into the rectum. The prostate was visualized and measured. The prostate appeared homogenous in appearance. Measured prostate volume is listed below. The ultrasound probe was removed. The patient tolerated the procedure well. Discharge instructions were again reviewed with the patient in detail. He was instructed to resume his antibiotics as previously ordered and was instructed to stay off all anticoagulation for 72 hours or until no further bleeding (hematuria or hematochezia) is noted. He was instructed to call the office immediately for any fevers or significant bleeding. PROSTATE VOLUME:  112 gm      Assessment and Plan      ICD-10-CM     1. Benign prostatic hyperplasia with lower urinary tract symptoms, symptom details unspecified  N40.1 AMB POC US, TRANSRECTAL       CBC with Auto Differential       Urinalysis       Culture, Urine       2. Urge incontinence  N39.41         3. Elevated PSA  R97.20         4. Urinary retention  R33.9         Discussed medical and surgical options for BPH. Separate E and M encounter to discuss options. He  would like to have TURP. Risks of bleeding, infection, stricture, incontinence, retrograde ejaculation, impotence discussed.

## 2022-12-01 NOTE — PERIOP NOTE
TRANSFER - OUT REPORT:    Verbal report given to FRANCA Cerna on Nael Haseeb  being transferred to 3rd floor [facility] for routine post-op       Report consisted of patients Situation, Background, Assessment and   Recommendations(SBAR). Information from the following report(s) Adult Overview, Surgery Report, Intake/Output, MAR, Recent Results, Med Rec Status, and Cardiac Rhythm NS  was reviewed with the receiving nurse. Lines:   Peripheral IV 12/01/22 Right;Dorsal Wrist (Active)   Site Assessment Clean, dry & intact 12/01/22 1427   Line Status Infusing 12/01/22 1427   Phlebitis Assessment No symptoms 12/01/22 1427   Infiltration Assessment 0 12/01/22 1427   Dressing Status Clean, dry & intact 12/01/22 1427   Dressing Type Transparent 12/01/22 1427        Opportunity for questions and clarification was provided. Patient transported with:   O2 @ 2 liters    VTE prophylaxis orders have not been written for [facility]. Patient and family given floor number and nurses name. Family updated re: pt status after security code verified.

## 2022-12-01 NOTE — OP NOTE
[facility]  OPERATIVE REPORT    Name:  Wilson Sal  MR#:  [id number]  :  1940  ACCOUNT #:  [de-identified]  DATE OF SERVICE:  2022    PREOPERATIVE DIAGNOSIS:  Benign prostatic hyperplasia. POSTOPERATIVE DIAGNOSIS:  Benign prostatic hyperplasia. PROCEDURE PERFORMED:  Transurethral resection of prostate. SURGEON:  Jade Luna MD    ASSISTANT:  None. ANESTHESIA:  General.    COMPLICATIONS:  None. SPECIMENS REMOVED:  TURP chips. IMPLANTS:  None. ESTIMATED BLOOD LOSS:  About 150 mL. FINDINGS:  Extremely enlarged prostate with evidence of previous TURP, but significant anteriorly based obstructing tissue. Resection time was approximately 120 minutes, about 100% longer than usual.    PROCEDURE:  The patient was given a general anesthetic, placed in dorsal lithotomy position. He was prepped and draped in a sterile fashion. I inserted a 28-Maori continuous flow resectoscope sheath over an obturator in the bladder. The monopolar resectoscope with a video camera and 30-degree lens was used. There is evidence of previous TURP posteriorly at the posterior bladder neck. The verumontanum is still evident, but there is some scar tissue from the right lateral lobe extending on to the verumontanum. In addition, there is significant anteriorly based lateral lobe BPH and also the anterior portion of the prostate is significant and obstructing. We began resection with the loop to open the channel. The adenomatous tissue was resected posteriorly down to the level of surgical capsule. Then, we began resecting the right lateral and the left lateral lobes. The patient was placed in Trendelenburg position so that the chips would irrigate back into the bladder. We resected very large amount of adenomatous tissue with the distal extent of resection being the level of verumontanum.   The anterior portion was limited due to the extreme angle, but I was able to resect a significant amount of anterior tissue to open up the channel. At the end of procedure, we assured that there was good hemostasis. I cauterized all visible bleeders and there were no significant bleeders noted by visualization. We took care to irrigate all the chips out of the bladder and checked the prostatic fossa and the bladder. Bladder did seem to show fairly small capacity which would probably account for the patient's significant lower urinary tract symptoms. At the end of procedure, a 24-Bermudian three-way [place] was inserted over a catheter guide in the bladder. I inflated the balloon with 40 mL of water. It was carefully irrigated and then placed on tension. The patient taken to recovery room in stable condition. As noted, this was a significantly enlarged prostate. We took about 100% longer than the usual time of resection. Prostate had weighed approximately 110 g in total volume by ultrasound.       Evert Muse MD      [id number]  D:  12/01/2022 13:49  T:  12/01/2022 14:44  JOB #:  [id number]

## 2022-12-02 ENCOUNTER — APPOINTMENT (OUTPATIENT)
Dept: GENERAL RADIOLOGY | Age: 82
End: 2022-12-02
Attending: UROLOGY
Payer: MEDICARE

## 2022-12-02 LAB
ANION GAP SERPL CALC-SCNC: 6 MMOL/L (ref 2–11)
BUN SERPL-MCNC: 20 MG/DL (ref 8–23)
CALCIUM SERPL-MCNC: 8 MG/DL (ref 8.3–10.4)
CHLORIDE SERPL-SCNC: 107 MMOL/L (ref 101–110)
CO2 SERPL-SCNC: 26 MMOL/L (ref 21–32)
CREAT SERPL-MCNC: 1.3 MG/DL (ref 0.8–1.5)
ECHO BSA: 2.22 M2
ERYTHROCYTE [DISTWIDTH] IN BLOOD BY AUTOMATED COUNT: 13.1 % (ref 11.9–14.6)
GLUCOSE BLD STRIP.AUTO-MCNC: 133 MG/DL (ref 65–100)
GLUCOSE BLD STRIP.AUTO-MCNC: 144 MG/DL (ref 65–100)
GLUCOSE BLD STRIP.AUTO-MCNC: 172 MG/DL (ref 65–100)
GLUCOSE BLD STRIP.AUTO-MCNC: 185 MG/DL (ref 65–100)
GLUCOSE SERPL-MCNC: 203 MG/DL (ref 65–100)
HCT VFR BLD AUTO: 39.7 % (ref 41.1–50.3)
HGB BLD-MCNC: 13.1 G/DL (ref 13.6–17.2)
MCH RBC QN AUTO: 30.5 PG (ref 26.1–32.9)
MCHC RBC AUTO-ENTMCNC: 33 G/DL (ref 31.4–35)
MCV RBC AUTO: 92.3 FL (ref 82–102)
NRBC # BLD: 0 K/UL (ref 0–0.2)
PLATELET # BLD AUTO: 159 K/UL (ref 150–450)
PMV BLD AUTO: 10.7 FL (ref 9.4–12.3)
POTASSIUM SERPL-SCNC: 3.6 MMOL/L (ref 3.5–5.1)
RBC # BLD AUTO: 4.3 M/UL (ref 4.23–5.6)
SERVICE CMNT-IMP: ABNORMAL
SODIUM SERPL-SCNC: 139 MMOL/L (ref 133–143)
WBC # BLD AUTO: 9.5 K/UL (ref 4.3–11.1)

## 2022-12-02 PROCEDURE — C1785 PMKR, DUAL, RATE-RESP: HCPCS | Performed by: INTERNAL MEDICINE

## 2022-12-02 PROCEDURE — 99152 MOD SED SAME PHYS/QHP 5/>YRS: CPT | Performed by: INTERNAL MEDICINE

## 2022-12-02 PROCEDURE — 99024 POSTOP FOLLOW-UP VISIT: CPT | Performed by: INTERNAL MEDICINE

## 2022-12-02 PROCEDURE — L3660 SO 8 AB RSTR CAN/WEB PRE OTS: HCPCS | Performed by: INTERNAL MEDICINE

## 2022-12-02 PROCEDURE — 85027 COMPLETE CBC AUTOMATED: CPT

## 2022-12-02 PROCEDURE — 82962 GLUCOSE BLOOD TEST: CPT

## 2022-12-02 PROCEDURE — 71045 X-RAY EXAM CHEST 1 VIEW: CPT

## 2022-12-02 PROCEDURE — 2580000003 HC RX 258: Performed by: UROLOGY

## 2022-12-02 PROCEDURE — 99153 MOD SED SAME PHYS/QHP EA: CPT | Performed by: INTERNAL MEDICINE

## 2022-12-02 PROCEDURE — 36415 COLL VENOUS BLD VENIPUNCTURE: CPT

## 2022-12-02 PROCEDURE — G0378 HOSPITAL OBSERVATION PER HR: HCPCS

## 2022-12-02 PROCEDURE — C1898 LEAD, PMKR, OTHER THAN TRANS: HCPCS | Performed by: INTERNAL MEDICINE

## 2022-12-02 PROCEDURE — 80048 BASIC METABOLIC PNL TOTAL CA: CPT

## 2022-12-02 PROCEDURE — 6360000002 HC RX W HCPCS: Performed by: INTERNAL MEDICINE

## 2022-12-02 PROCEDURE — C1892 INTRO/SHEATH,FIXED,PEEL-AWAY: HCPCS | Performed by: INTERNAL MEDICINE

## 2022-12-02 PROCEDURE — 2500000003 HC RX 250 WO HCPCS: Performed by: INTERNAL MEDICINE

## 2022-12-02 PROCEDURE — 33208 INSRT HEART PM ATRIAL & VENT: CPT | Performed by: INTERNAL MEDICINE

## 2022-12-02 PROCEDURE — 96376 TX/PRO/DX INJ SAME DRUG ADON: CPT

## 2022-12-02 PROCEDURE — 2580000003 HC RX 258: Performed by: INTERNAL MEDICINE

## 2022-12-02 PROCEDURE — 96374 THER/PROPH/DIAG INJ IV PUSH: CPT

## 2022-12-02 PROCEDURE — 6370000000 HC RX 637 (ALT 250 FOR IP): Performed by: UROLOGY

## 2022-12-02 PROCEDURE — 2709999900 HC NON-CHARGEABLE SUPPLY: Performed by: INTERNAL MEDICINE

## 2022-12-02 DEVICE — IMPLANTABLE DEVICE: Type: IMPLANTABLE DEVICE | Status: FUNCTIONAL

## 2022-12-02 DEVICE — LEAD PACE AD 6FR L58CM VENT SIL PLAT INSUL BPLR PLATINIZED 507658] MEDTRONIC CARDIAC RTHYM MGT]: Type: IMPLANTABLE DEVICE | Status: FUNCTIONAL

## 2022-12-02 DEVICE — IPG W1DR01 AZURE XT DR MRI USA
Type: IMPLANTABLE DEVICE | Status: FUNCTIONAL
Brand: AZURE™ XT DR MRI SURESCAN™

## 2022-12-02 RX ORDER — SODIUM CHLORIDE 0.9 % (FLUSH) 0.9 %
5-40 SYRINGE (ML) INJECTION PRN
Status: DISCONTINUED | OUTPATIENT
Start: 2022-12-02 | End: 2022-12-04 | Stop reason: HOSPADM

## 2022-12-02 RX ORDER — LIDOCAINE HYDROCHLORIDE 10 MG/ML
INJECTION, SOLUTION INFILTRATION; PERINEURAL PRN
Status: DISCONTINUED | OUTPATIENT
Start: 2022-12-02 | End: 2022-12-02 | Stop reason: HOSPADM

## 2022-12-02 RX ORDER — SODIUM CHLORIDE 0.9 % (FLUSH) 0.9 %
5-40 SYRINGE (ML) INJECTION EVERY 12 HOURS SCHEDULED
Status: DISCONTINUED | OUTPATIENT
Start: 2022-12-02 | End: 2022-12-04 | Stop reason: HOSPADM

## 2022-12-02 RX ORDER — SODIUM CHLORIDE 9 MG/ML
INJECTION, SOLUTION INTRAVENOUS PRN
Status: DISCONTINUED | OUTPATIENT
Start: 2022-12-02 | End: 2022-12-04 | Stop reason: HOSPADM

## 2022-12-02 RX ORDER — MIDAZOLAM HYDROCHLORIDE 1 MG/ML
INJECTION INTRAMUSCULAR; INTRAVENOUS PRN
Status: DISCONTINUED | OUTPATIENT
Start: 2022-12-02 | End: 2022-12-02 | Stop reason: HOSPADM

## 2022-12-02 RX ADMIN — ACETAMINOPHEN 650 MG: 325 TABLET ORAL at 20:49

## 2022-12-02 RX ADMIN — SODIUM CHLORIDE, PRESERVATIVE FREE 10 ML: 5 INJECTION INTRAVENOUS at 20:24

## 2022-12-02 RX ADMIN — OXYCODONE 5 MG: 5 TABLET ORAL at 20:50

## 2022-12-02 RX ADMIN — SODIUM CHLORIDE: 9 INJECTION, SOLUTION INTRAVENOUS at 17:28

## 2022-12-02 RX ADMIN — METFORMIN HYDROCHLORIDE 500 MG: 500 TABLET ORAL at 16:05

## 2022-12-02 RX ADMIN — LATANOPROST 1 DROP: 50 SOLUTION OPHTHALMIC at 20:23

## 2022-12-02 RX ADMIN — FINASTERIDE 5 MG: 5 TABLET, FILM COATED ORAL at 08:22

## 2022-12-02 RX ADMIN — HYDROCHLOROTHIAZIDE 37.5 MG: 25 TABLET ORAL at 08:21

## 2022-12-02 RX ADMIN — NEOMYCIN AND POLYMYXIN B SULFATES: 40; 200000 SOLUTION IRRIGATION at 10:00

## 2022-12-02 RX ADMIN — SODIUM CHLORIDE, PRESERVATIVE FREE 10 ML: 5 INJECTION INTRAVENOUS at 20:23

## 2022-12-02 RX ADMIN — LEVOTHYROXINE SODIUM 125 MCG: 0.12 TABLET ORAL at 05:28

## 2022-12-02 RX ADMIN — GLIPIZIDE 5 MG: 5 TABLET ORAL at 16:05

## 2022-12-02 RX ADMIN — AMLODIPINE BESYLATE 5 MG: 5 TABLET ORAL at 08:21

## 2022-12-02 RX ADMIN — ACETAMINOPHEN 650 MG: 325 TABLET ORAL at 16:05

## 2022-12-02 RX ADMIN — SODIUM CHLORIDE: 9 INJECTION, SOLUTION INTRAVENOUS at 05:08

## 2022-12-02 RX ADMIN — ATORVASTATIN CALCIUM 20 MG: 20 TABLET, FILM COATED ORAL at 20:20

## 2022-12-02 RX ADMIN — SODIUM CHLORIDE, PRESERVATIVE FREE 10 ML: 5 INJECTION INTRAVENOUS at 08:22

## 2022-12-02 RX ADMIN — LOSARTAN POTASSIUM 100 MG: 50 TABLET, FILM COATED ORAL at 08:21

## 2022-12-02 RX ADMIN — CEFAZOLIN SODIUM 2000 MG: 100 INJECTION, POWDER, LYOPHILIZED, FOR SOLUTION INTRAVENOUS at 18:18

## 2022-12-02 RX ADMIN — METFORMIN HYDROCHLORIDE 500 MG: 500 TABLET ORAL at 08:21

## 2022-12-02 RX ADMIN — GLIPIZIDE 5 MG: 5 TABLET ORAL at 08:22

## 2022-12-02 ASSESSMENT — PAIN SCALES - GENERAL
PAINLEVEL_OUTOF10: 0
PAINLEVEL_OUTOF10: 3
PAINLEVEL_OUTOF10: 3
PAINLEVEL_OUTOF10: 0
PAINLEVEL_OUTOF10: 9

## 2022-12-02 ASSESSMENT — PAIN DESCRIPTION - DESCRIPTORS
DESCRIPTORS: ACHING

## 2022-12-02 ASSESSMENT — PAIN - FUNCTIONAL ASSESSMENT
PAIN_FUNCTIONAL_ASSESSMENT: PREVENTS OR INTERFERES SOME ACTIVE ACTIVITIES AND ADLS
PAIN_FUNCTIONAL_ASSESSMENT: PREVENTS OR INTERFERES SOME ACTIVE ACTIVITIES AND ADLS

## 2022-12-02 ASSESSMENT — PAIN SCALES - WONG BAKER: WONGBAKER_NUMERICALRESPONSE: 0

## 2022-12-02 ASSESSMENT — PAIN DESCRIPTION - ONSET: ONSET: PROGRESSIVE

## 2022-12-02 ASSESSMENT — PAIN DESCRIPTION - FREQUENCY: FREQUENCY: INTERMITTENT

## 2022-12-02 ASSESSMENT — PAIN DESCRIPTION - ORIENTATION
ORIENTATION: ANTERIOR
ORIENTATION: LEFT
ORIENTATION: LEFT

## 2022-12-02 ASSESSMENT — PAIN DESCRIPTION - LOCATION
LOCATION: SHOULDER
LOCATION: GENERALIZED;INCISION
LOCATION: HEAD

## 2022-12-02 ASSESSMENT — PAIN DESCRIPTION - PAIN TYPE: TYPE: SURGICAL PAIN

## 2022-12-02 NOTE — CARE COORDINATION
Patient admitted for scheduled TURP which patient underwent on 12/01. Patient developed complete heart block at the end of the procedure. Cardiology consulted. PPI today. CM met with patient and his spouse to discuss discharge needs. CM anticipates patient discharging home with no needs but CM will remain available. 12/02/22 1003   Service Assessment   Patient Orientation Alert and Oriented   Cognition Alert   History Provided By Patient   Primary Caregiver Self   Accompanied By/Relationship spouse   Support Systems Spouse/Significant Other   Patient's Healthcare Decision Maker is: Named in [address] [place] [address]   PCP Verified by CM Yes   Last Visit to PCP Within last 3 months   Prior Functional Level Independent in ADLs/IADLs   Current Functional Level Independent in ADLs/IADLs   Can patient return to prior living arrangement Yes   Ability to make needs known: Good   Family able to assist with home care needs: Yes   Would you like for me to discuss the discharge plan with any other family members/significant others, and if so, who? No   Financial Resources Medicare   Social/Functional History   Lives With Spouse   Type of [address] [place] [address] Help From Family   ADL Assistance Independent   Homemaking Assistance Independent   Ambulation Assistance Independent   Transfer Assistance Independent   Active  Yes   Mode of Transportation Car   Discharge Planning   Living Arrangements Spouse/Significant Other   Current Services Prior To Admission None   Potential Assistance Needed N/A   DME Ordered? No   Potential Assistance Purchasing Medications No   Type of Home Care Services None   Patient expects to be discharged to: [facility]   Services At/After Discharge None   [employer] Information Provided?  No   Mode of Transport at Discharge   (Spouse to transport home.)   Confirm Follow Up Transport Family

## 2022-12-02 NOTE — PROGRESS NOTES
Admit Date: 12/1/2022      Subjective:     Shirley Salazar is POD 1 TURP. Cardiology consulted for heart block post operatively. Pt is seen by Dr. Arellano Courser this morning. On 3rd floor. Urine clear with cbi. Vss. Going for pacer today. Objective:     Patient Vitals for the past 8 hrs:   BP Temp src Pulse Resp SpO2   12/02/22 1302 123/63 -- 68 -- 98 %   12/02/22 1247 119/65 -- 73 -- 100 %   12/02/22 1242 121/61 -- 68 -- 99 %   12/02/22 0801 (!) 150/88 Oral 85 19 97 %     12/02 0701 - 12/02 1900  In: -   Out: 6581 [Urine:4000]  11/30 1901 - 12/02 0700  In: 4100 [P.O.:100;  I.V.:1000]  Out: 48557 [Urine:54424]    Physical Exam:  GENERAL ASSESSMENT: alert, oriented to person, place and time, no acute distress   Chest: easy work of breathing  CVS exam: normal rate, regular rhythm, normal S1, S2  ABDOMEN: soft, non tender  Neurological exam reveals alert, oriented, normal speech    Data Review   Recent Results (from the past 24 hour(s))   POCT Glucose    Collection Time: 12/01/22  1:58 PM   Result Value Ref Range    POC Glucose 205 (H) 65 - 100 mg/dL    Performed by: Oscar    EKG 12 Lead    Collection Time: 12/01/22  2:08 PM   Result Value Ref Range    Ventricular Rate 41 BPM    Atrial Rate 66 BPM    QRS Duration 118 ms    Q-T Interval 558 ms    QTc Calculation (Bazett) 460 ms    P Axis 65 degrees    R Axis -79 degrees    T Axis 197 degrees    Diagnosis Sinus rhythm with complete heart block    Hemoglobin and Hematocrit    Collection Time: 12/01/22  2:22 PM   Result Value Ref Range    Hemoglobin 14.2 13.6 - 17.2 g/dL    Hematocrit 43.7 41.1 - 50.3 %   Basic Metabolic Panel    Collection Time: 12/01/22  2:22 PM   Result Value Ref Range    Sodium 138 133 - 143 mmol/L    Potassium 3.7 3.5 - 5.1 mmol/L    Chloride 106 101 - 110 mmol/L    CO2 28 21 - 32 mmol/L    Anion Gap 4 2 - 11 mmol/L    Glucose 217 (H) 65 - 100 mg/dL    BUN 21 8 - 23 MG/DL    Creatinine 1.50 0.8 - 1.5 MG/DL    Est, Glom Filt Rate 46 (L) >60 ml/min/1.73m2    Calcium 8.3 8.3 - 10.4 MG/DL   Transthoracic echocardiogram (TTE) complete with contrast, bubble, strain, and 3D PRN    Collection Time: 12/01/22  3:47 PM   Result Value Ref Range    LV EDV A2C 114 mL    LV EDV A4C 180 mL    LV ESV A2C 45 mL    LV ESV A4C 69 mL    IVSd 1.2 (A) 0.6 - 1.0 cm    LVIDd 4.4 4.2 - 5.9 cm    LVIDs 2.9 cm    LVOT Diameter 2.4 cm    LVOT Mean Gradient 1 mmHg    LVOT VTI 21.6 cm    LVOT Peak Velocity 0.8 m/s    LVOT Peak Gradient 3 mmHg    LVPWd 1.4 (A) 0.6 - 1.0 cm    LV E' Lateral Velocity 7 cm/s    LV E' Septal Velocity 7 cm/s    LV Ejection Fraction A2C 61 %    LV Ejection Fraction A4C 62 %    EF BP 61 55 - 100 %    LVOT Area 4.5 cm2    LVOT SV 97.7 ml    LA Minor Axis 5.1 cm    LA Major [place] 6.7 cm    LA Area 2C 18.7 cm2    LA Area 4C 24.7 cm2    LA Volume 2C 57 18 - 58 mL    LA Volume 4C 71 (A) 18 - 58 mL    LA Diameter 3.5 cm    AV Mean Velocity 0.7 m/s    AV Mean Gradient 2 mmHg    AV VTI 25.5 cm    AV Peak Velocity 1.0 m/s    AV Peak Gradient 4 mmHg    AV Area by VTI 3.8 cm2    AV Area by Peak Velocity 3.6 cm2    Aortic Root 4.1 cm    Ascending Aorta 3.6 cm    IVC Proxmal 2.0 cm    MV E Wave Deceleration Time 275.0 ms    MV A Velocity 1.18 m/s    MV E Velocity 0.83 m/s    PV .0 ms    PV Max Velocity 0.9 m/s    PV Peak Gradient 3 mmHg    RVIDd 4.5 cm    RV Basal Dimension 3.6 cm    RV Free Wall Peak S' 13 cm/s    TAPSE 2.6 1.7 cm    Body Surface Area 2.22 m2    Fractional Shortening 2D 34 28 - 44 %    LV ESV Index A4C 32 mL/m2    LV EDV Index A4C 83 mL/m2    LV ESV Index A2C 21 mL/m2    LV EDV Index A2C 52 mL/m2    LVIDd Index 2.02 cm/m2    LVIDs Index 1.33 cm/m2    LV RWT Ratio 0.64     LV Mass 2D 215.1 88 - 224 g    LV Mass 2D Index 98.7 49 - 115 g/m2    MV E/A 0.70     E/E' Ratio (Averaged) 11.86     E/E' Lateral 11.86     E/E' Septal 11.86     LVOT Stroke Volume Index 44.8 mL/m2    LA Volume Index 2C 26 16 - 34 mL/m2    LA Volume Index 4C 33 16 - 34 mL/m2 LA Size Index 1.61 cm/m2    LA/AO Root Ratio 0.85     Ao Root Index 1.88 cm/m2    Ascending Aorta Index 1.65 cm/m2    AV Velocity Ratio 0.80     LVOT:AV VTI Index 0.85     LETICIA/BSA VTI 1.7 cm2/m2    LETICIA/BSA Peak Velocity 1.7 cm2/m2    Est. RA Pressure 3 mmHg   POCT Glucose    Collection Time: 12/01/22  9:50 PM   Result Value Ref Range    POC Glucose 339 (H) 65 - 100 mg/dL    Performed by: Noni Burkitt    Basic Metabolic Panel    Collection Time: 12/02/22  5:20 AM   Result Value Ref Range    Sodium 139 133 - 143 mmol/L    Potassium 3.6 3.5 - 5.1 mmol/L    Chloride 107 101 - 110 mmol/L    CO2 26 21 - 32 mmol/L    Anion Gap 6 2 - 11 mmol/L    Glucose 203 (H) 65 - 100 mg/dL    BUN 20 8 - 23 MG/DL    Creatinine 1.30 0.8 - 1.5 MG/DL    Est, Glom Filt Rate 55 (L) >60 ml/min/1.73m2    Calcium 8.0 (L) 8.3 - 10.4 MG/DL   CBC    Collection Time: 12/02/22  5:20 AM   Result Value Ref Range    WBC 9.5 4.3 - 11.1 K/uL    RBC 4.30 4.23 - 5.6 M/uL    Hemoglobin 13.1 (L) 13.6 - 17.2 g/dL    Hematocrit 39.7 (L) 41.1 - 50.3 %    MCV 92.3 82 - 102 FL    MCH 30.5 26.1 - 32.9 PG    MCHC 33.0 31.4 - 35.0 g/dL    RDW 13.1 11.9 - 14.6 %    Platelets 986 541 - 645 K/uL    MPV 10.7 9.4 - 12.3 FL    nRBC 0.00 0.0 - 0.2 K/uL   POCT Glucose    Collection Time: 12/02/22  7:59 AM   Result Value Ref Range    POC Glucose 144 (H) 65 - 100 mg/dL    Performed by: To    Electrophysiology procedure    Collection Time: 12/02/22 12:16 PM   Result Value Ref Range    Body Surface Area 2.22 m2       Assessment:     Principal Problem:    Benign prostatic hyperplasia with lower urinary tract symptoms  Active Problems:    Complete heart block (HCC)    BPH with obstruction/lower urinary tract symptoms    Acquired hypothyroidism    Controlled type 2 diabetes mellitus with diabetic neuropathy, without long-term current use of insulin (HCC)    Hypercholesterolemia    Essential hypertension  Resolved Problems:    * No resolved hospital problems. *    PREOPERATIVE DIAGNOSIS:  Benign prostatic hyperplasia. PROCEDURE PERFORMED:  Transurethral resection of prostate. Cr 1.30  Hgb 13.1  Afebrile, VSS    Plan:     Continue CBI. Titrate drip to keep clear and wean as tolerated. Manually irrigate as needed. Continue IVF. Ambulate. Continue incentive spirometer. Going for pacer today, appreciate cardiology. Signed By: Norma Valencia, APRN - CNP     December 2, 2022      [facility]    I have reviewed the above note and examined the patient. I agree with the exam, assessment and plan.     Lien Natarajan MD

## 2022-12-02 NOTE — PROGRESS NOTES
TRANSFER - OUT REPORT:    [employer] dual PPM with [place]  DDD   Left chest incision closed with sutures, dermabond, and primaseal    Versed 4mg IV  Fentanyl 75mcg IV  Ancef 2g IV    Verbal report given to Jenny Heard RN on Shirley Cables  being transferred to [facility] for routine progression of patient care       Report consisted of patient's Situation, Background, Assessment and   Recommendations(SBAR). Information from the following report(s) Nurse Handoff Report and MAR was reviewed with the receiving nurse.

## 2022-12-02 NOTE — ACP (ADVANCE CARE PLANNING)
Advance Care Planning     General Advance Care Planning (ACP) Conversation    Date of Conversation: 11/7/2022  Conducted with: Patient with Decision Making Capacity    Healthcare Decision Maker:    Primary Decision Maker: Mitali Garcia - Spouse - [phone]  Click here to complete Healthcare Decision Makers including selection of the Healthcare Decision Maker Relationship (ie \"Primary\"). Today we documented Decision Maker(s) consistent with ACP documents on file. Content/Action Overview:   Has ACP document(s) on file - reflects the patient's care preferences  Reviewed DNR/DNI and patient elects Full Code (Attempt Resuscitation)        Length of Voluntary ACP Conversation in minutes:  <16 minutes (Non-Billable)    Elba Officer, RN

## 2022-12-02 NOTE — PROGRESS NOTES
TRANSFER - OUT REPORT:    Verbal report given to [facility] AUTHORITY RN on Raymon Castillo  being transferred to [facility] for routine progression of patient care       Report consisted of patient's Situation, Background, Assessment and   Recommendations(SBAR). Information from the following report(s) Nurse Handoff Report was reviewed with the receiving nurse. [place] Assessment: No data recorded  Lines:   Peripheral IV 12/01/22 Right;Dorsal Wrist (Active)   Site Assessment Clean, dry & intact 12/02/22 0820   Line Status Flushed; Infusing 12/02/22 0820   Line Care Connections checked and tightened 12/02/22 0400   Phlebitis Assessment No symptoms 12/02/22 0820   Infiltration Assessment 0 12/02/22 0820   Alcohol Cap Used Yes 12/02/22 0400   Dressing Status Clean, dry & intact 12/02/22 0820   Dressing Type Transparent 12/02/22 0820       Peripheral IV Right Forearm (Active)   Site Assessment Clean, dry & intact 12/02/22 0820   Line Status Capped;Flushed 12/02/22 0820   Line Care Cap changed 12/02/22 0820   Phlebitis Assessment No symptoms 12/02/22 0820   Infiltration Assessment 0 12/02/22 0820   Alcohol Cap Used Yes 12/02/22 0820   Dressing Status Clean, dry & intact 12/02/22 0820   Dressing Type Transparent 12/02/22 0820       Peripheral IV 12/02/22 Left;Posterior Forearm (Active)   Site Assessment Clean, dry & intact 12/02/22 1021   Line Status Capped;Flushed;Blood return noted 12/02/22 [address] changed 12/02/22 1021   Phlebitis Assessment No symptoms 12/02/22 1021   Infiltration Assessment 0 12/02/22 1021   Alcohol Cap Used Yes 12/02/22 1021   Dressing Status New dressing applied;Clean, dry & intact 12/02/22 1021   Dressing Type Transparent 12/02/22 1021        Opportunity for questions and clarification was provided.       Patient transported with:  Registered Nurse

## 2022-12-02 NOTE — PLAN OF CARE
Problem: Chronic Conditions and Co-morbidities  Goal: Patient's chronic conditions and co-morbidity symptoms are monitored and maintained or improved  Outcome: Progressing     Problem: Pain  Goal: Verbalizes/displays adequate comfort level or baseline comfort level  Outcome: Progressing     Problem: Discharge Planning  Goal: Discharge to home or other facility with appropriate resources  Outcome: Progressing     Problem: Safety - Adult  Goal: Free from fall injury  Outcome: Progressing     Problem: ABCDS Injury Assessment  Goal: Absence of physical injury  Outcome: Progressing

## 2022-12-02 NOTE — PROGRESS NOTES
TRANSFER - IN REPORT:    Verbal report received from Kristin on George Raphael  being received from [facility] for     Report consisted of patient's Situation, Background, Assessment and   Recommendations(SBAR). Information from the following report(s) Nurse Handoff Report was reviewed with the receiving nurse. [employer] dual PPM with [place]  DDD   Left chest incision closed with sutures, dermabond, and primaseal     Versed 4mg IV  Fentanyl 75mcg IV  Ancef 2g IV  Opportunity for questions and clarification was provided. Assessment completed upon patient's arrival to unit and care assumed.

## 2022-12-03 LAB
GLUCOSE BLD STRIP.AUTO-MCNC: 148 MG/DL (ref 65–100)
SERVICE CMNT-IMP: ABNORMAL

## 2022-12-03 PROCEDURE — 96376 TX/PRO/DX INJ SAME DRUG ADON: CPT

## 2022-12-03 PROCEDURE — 2580000003 HC RX 258: Performed by: UROLOGY

## 2022-12-03 PROCEDURE — 2580000003 HC RX 258: Performed by: INTERNAL MEDICINE

## 2022-12-03 PROCEDURE — 82962 GLUCOSE BLOOD TEST: CPT

## 2022-12-03 PROCEDURE — 6360000002 HC RX W HCPCS: Performed by: INTERNAL MEDICINE

## 2022-12-03 PROCEDURE — 6370000000 HC RX 637 (ALT 250 FOR IP): Performed by: UROLOGY

## 2022-12-03 PROCEDURE — G0378 HOSPITAL OBSERVATION PER HR: HCPCS

## 2022-12-03 RX ADMIN — SODIUM CHLORIDE, PRESERVATIVE FREE 10 ML: 5 INJECTION INTRAVENOUS at 08:10

## 2022-12-03 RX ADMIN — FINASTERIDE 5 MG: 5 TABLET, FILM COATED ORAL at 08:10

## 2022-12-03 RX ADMIN — SODIUM CHLORIDE, PRESERVATIVE FREE 10 ML: 5 INJECTION INTRAVENOUS at 22:21

## 2022-12-03 RX ADMIN — HYDROCHLOROTHIAZIDE 37.5 MG: 25 TABLET ORAL at 08:10

## 2022-12-03 RX ADMIN — AMLODIPINE BESYLATE 5 MG: 5 TABLET ORAL at 08:10

## 2022-12-03 RX ADMIN — LEVOTHYROXINE SODIUM 125 MCG: 0.12 TABLET ORAL at 07:35

## 2022-12-03 RX ADMIN — ACETAMINOPHEN 650 MG: 325 TABLET ORAL at 11:27

## 2022-12-03 RX ADMIN — ATORVASTATIN CALCIUM 20 MG: 20 TABLET, FILM COATED ORAL at 21:00

## 2022-12-03 RX ADMIN — LOSARTAN POTASSIUM 100 MG: 50 TABLET, FILM COATED ORAL at 08:10

## 2022-12-03 RX ADMIN — METFORMIN HYDROCHLORIDE 500 MG: 500 TABLET ORAL at 08:10

## 2022-12-03 RX ADMIN — LATANOPROST 1 DROP: 50 SOLUTION OPHTHALMIC at 21:00

## 2022-12-03 RX ADMIN — ACETAMINOPHEN 650 MG: 325 TABLET ORAL at 22:13

## 2022-12-03 RX ADMIN — METFORMIN HYDROCHLORIDE 500 MG: 500 TABLET ORAL at 15:32

## 2022-12-03 RX ADMIN — GLIPIZIDE 5 MG: 5 TABLET ORAL at 08:10

## 2022-12-03 RX ADMIN — CEFAZOLIN SODIUM 2000 MG: 100 INJECTION, POWDER, LYOPHILIZED, FOR SOLUTION INTRAVENOUS at 03:00

## 2022-12-03 RX ADMIN — OXYCODONE 5 MG: 5 TABLET ORAL at 23:43

## 2022-12-03 RX ADMIN — SODIUM CHLORIDE, PRESERVATIVE FREE 10 ML: 5 INJECTION INTRAVENOUS at 22:22

## 2022-12-03 RX ADMIN — GLIPIZIDE 5 MG: 5 TABLET ORAL at 15:32

## 2022-12-03 ASSESSMENT — PAIN DESCRIPTION - LOCATION
LOCATION: SHOULDER

## 2022-12-03 ASSESSMENT — PAIN DESCRIPTION - ONSET: ONSET: ON-GOING

## 2022-12-03 ASSESSMENT — PAIN DESCRIPTION - DESCRIPTORS
DESCRIPTORS: ACHING
DESCRIPTORS: ACHING
DESCRIPTORS: DISCOMFORT

## 2022-12-03 ASSESSMENT — PAIN DESCRIPTION - ORIENTATION
ORIENTATION: LEFT

## 2022-12-03 ASSESSMENT — PAIN DESCRIPTION - FREQUENCY: FREQUENCY: INTERMITTENT

## 2022-12-03 ASSESSMENT — PAIN SCALES - WONG BAKER: WONGBAKER_NUMERICALRESPONSE: 2

## 2022-12-03 ASSESSMENT — PAIN SCALES - GENERAL
PAINLEVEL_OUTOF10: 4
PAINLEVEL_OUTOF10: 9
PAINLEVEL_OUTOF10: 0
PAINLEVEL_OUTOF10: 8

## 2022-12-03 ASSESSMENT — PAIN DESCRIPTION - PAIN TYPE: TYPE: SURGICAL PAIN

## 2022-12-03 NOTE — PROGRESS NOTES
Patient denies significant complaints. Pacemaker appears to be normally functioning. Chest x-ray without pneumothorax. We will arrange follow-up in our office. Patient stable for discharge from cardiac perspective. Cardiac condition stable. Will sign off. Please call with any questions or clinical changes. Appreciate consultation.         Vega Crowe MD

## 2022-12-03 NOTE — PROGRESS NOTES
Urologist at bedside, and he Closed clamp on CBI. Keep CBI stopped, wait one hour, if UOP bloody with clots, restart CBI, and continue at a slow drip rate, just enough CBI to keep from clotting. This RN notified Primary Nurse Paula Acosta.

## 2022-12-03 NOTE — PROGRESS NOTES
Urology Progress Note    Admit Date: 12/1/2022    Subjective:     Patient has no new complaints. UOP clear on fast drip cbi. Objective:     Patient Vitals for the past 8 hrs:   BP Temp Temp src Pulse Resp SpO2   12/03/22 0818 139/64 98.8 °F (37.1 °C) -- 73 16 98 %   12/03/22 0442 129/71 98.5 °F (36.9 °C) Oral 74 18 98 %     No intake/output data recorded. 12/01 1901 - 12/03 0700  In: 3000   Out: 30609 [Urine:01172]    Physical Exam:     Awake, alert, and oriented  Lungs no JVD. Breathing is  non-labored; no audible wheezing. Heart regular, normal perfusion  Abd soft, nt, nd  UOP clear      Data Review   Recent Results (from the past 24 hour(s))   Electrophysiology procedure    Collection Time: 12/02/22 12:16 PM   Result Value Ref Range    Body Surface Area 2.22 m2   POCT Glucose    Collection Time: 12/02/22  1:17 PM   Result Value Ref Range    POC Glucose 133 (H) 65 - 100 mg/dL    Performed by: RottachJessicaPCT    POCT Glucose    Collection Time: 12/02/22  4:03 PM   Result Value Ref Range    POC Glucose 172 (H) 65 - 100 mg/dL    Performed by: RottachJessicaPCT    POCT Glucose    Collection Time: 12/02/22  7:51 PM   Result Value Ref Range    POC Glucose 185 (H) 65 - 100 mg/dL    Performed by: FTAPI SoftwaresminePCT            Assessment:     Principal Problem:    Benign prostatic hyperplasia with lower urinary tract symptoms  Active Problems:    Complete heart block (HCC)    BPH with obstruction/lower urinary tract symptoms    Acquired hypothyroidism    Controlled type 2 diabetes mellitus with diabetic neuropathy, without long-term current use of insulin ([address]    Hypercholesterolemia    Essential hypertension  Resolved Problems:    * No resolved hospital problems.  *      Pod 2 from TURP  Plan:     -stop cbi this AM; if remains clear after 2 hours, remove leyva and give voiding trial; if not clear, then restart cbi  -likely home tomorrow      Aleja Dexter MD    [facility]rology  [address] [facility]

## 2022-12-04 VITALS
RESPIRATION RATE: 18 BRPM | HEART RATE: 73 BPM | TEMPERATURE: 97.7 F | HEIGHT: 71 IN | OXYGEN SATURATION: 100 % | BODY MASS INDEX: 31.98 KG/M2 | DIASTOLIC BLOOD PRESSURE: 70 MMHG | SYSTOLIC BLOOD PRESSURE: 141 MMHG | WEIGHT: 228.4 LBS

## 2022-12-04 LAB
ANION GAP SERPL CALC-SCNC: 8 MMOL/L (ref 2–11)
BASOPHILS # BLD: 0.1 K/UL (ref 0–0.2)
BASOPHILS NFR BLD: 1 % (ref 0–2)
BUN SERPL-MCNC: 19 MG/DL (ref 8–23)
CALCIUM SERPL-MCNC: 8.6 MG/DL (ref 8.3–10.4)
CHLORIDE SERPL-SCNC: 102 MMOL/L (ref 101–110)
CO2 SERPL-SCNC: 25 MMOL/L (ref 21–32)
CREAT SERPL-MCNC: 1.2 MG/DL (ref 0.8–1.5)
DIFFERENTIAL METHOD BLD: ABNORMAL
EOSINOPHIL # BLD: 0.3 K/UL (ref 0–0.8)
EOSINOPHIL NFR BLD: 4 % (ref 0.5–7.8)
ERYTHROCYTE [DISTWIDTH] IN BLOOD BY AUTOMATED COUNT: 13.2 % (ref 11.9–14.6)
GLUCOSE BLD STRIP.AUTO-MCNC: 201 MG/DL (ref 65–100)
GLUCOSE BLD STRIP.AUTO-MCNC: 249 MG/DL (ref 65–100)
GLUCOSE SERPL-MCNC: 198 MG/DL (ref 65–100)
HCT VFR BLD AUTO: 39.9 % (ref 41.1–50.3)
HGB BLD-MCNC: 12.8 G/DL (ref 13.6–17.2)
IMM GRANULOCYTES # BLD AUTO: 0 K/UL (ref 0–0.5)
IMM GRANULOCYTES NFR BLD AUTO: 0 % (ref 0–5)
LYMPHOCYTES # BLD: 1.2 K/UL (ref 0.5–4.6)
LYMPHOCYTES NFR BLD: 17 % (ref 13–44)
MCH RBC QN AUTO: 30.3 PG (ref 26.1–32.9)
MCHC RBC AUTO-ENTMCNC: 32.1 G/DL (ref 31.4–35)
MCV RBC AUTO: 94.5 FL (ref 82–102)
MONOCYTES # BLD: 0.6 K/UL (ref 0.1–1.3)
MONOCYTES NFR BLD: 9 % (ref 4–12)
NEUTS SEG # BLD: 4.9 K/UL (ref 1.7–8.2)
NEUTS SEG NFR BLD: 69 % (ref 43–78)
NRBC # BLD: 0 K/UL (ref 0–0.2)
PLATELET # BLD AUTO: 153 K/UL (ref 150–450)
PMV BLD AUTO: 10.5 FL (ref 9.4–12.3)
POTASSIUM SERPL-SCNC: 3.7 MMOL/L (ref 3.5–5.1)
RBC # BLD AUTO: 4.22 M/UL (ref 4.23–5.6)
SERVICE CMNT-IMP: ABNORMAL
SERVICE CMNT-IMP: ABNORMAL
SODIUM SERPL-SCNC: 135 MMOL/L (ref 133–143)
WBC # BLD AUTO: 7 K/UL (ref 4.3–11.1)

## 2022-12-04 PROCEDURE — 82962 GLUCOSE BLOOD TEST: CPT

## 2022-12-04 PROCEDURE — 6370000000 HC RX 637 (ALT 250 FOR IP): Performed by: UROLOGY

## 2022-12-04 PROCEDURE — 2580000003 HC RX 258: Performed by: INTERNAL MEDICINE

## 2022-12-04 PROCEDURE — G0378 HOSPITAL OBSERVATION PER HR: HCPCS

## 2022-12-04 PROCEDURE — 2580000003 HC RX 258: Performed by: UROLOGY

## 2022-12-04 PROCEDURE — 36415 COLL VENOUS BLD VENIPUNCTURE: CPT

## 2022-12-04 PROCEDURE — 80048 BASIC METABOLIC PNL TOTAL CA: CPT

## 2022-12-04 PROCEDURE — 85025 COMPLETE CBC W/AUTO DIFF WBC: CPT

## 2022-12-04 RX ADMIN — LEVOTHYROXINE SODIUM 125 MCG: 0.12 TABLET ORAL at 09:32

## 2022-12-04 RX ADMIN — SODIUM CHLORIDE, PRESERVATIVE FREE 5 ML: 5 INJECTION INTRAVENOUS at 10:49

## 2022-12-04 RX ADMIN — LOSARTAN POTASSIUM 100 MG: 50 TABLET, FILM COATED ORAL at 09:32

## 2022-12-04 RX ADMIN — HYDROCHLOROTHIAZIDE 37.5 MG: 25 TABLET ORAL at 09:32

## 2022-12-04 RX ADMIN — METFORMIN HYDROCHLORIDE 500 MG: 500 TABLET ORAL at 09:32

## 2022-12-04 RX ADMIN — FINASTERIDE 5 MG: 5 TABLET, FILM COATED ORAL at 09:32

## 2022-12-04 RX ADMIN — AMLODIPINE BESYLATE 5 MG: 5 TABLET ORAL at 09:32

## 2022-12-04 RX ADMIN — GLIPIZIDE 5 MG: 5 TABLET ORAL at 09:32

## 2022-12-04 ASSESSMENT — PAIN SCALES - WONG BAKER: WONGBAKER_NUMERICALRESPONSE: 0

## 2022-12-04 ASSESSMENT — PAIN SCALES - GENERAL: PAINLEVEL_OUTOF10: 0

## 2022-12-04 NOTE — CARE COORDINATION
Chart screened by  for potential discharge needs or concerns. None identified or reported. Patient is medically cleared for discharge to home today. Please notify/consult  if discharge needs arise. Spouse to transport. 12/02/22 1003   Service Assessment   Patient Orientation Alert and Oriented   Cognition Alert   History Provided By Patient   Primary Caregiver Self   Accompanied By/Relationship spouse   Support Systems Spouse/Significant Other   Patient's Healthcare Decision Maker is: Named in [address] [place] [address]   PCP Verified by CM Yes   Last Visit to PCP Within last 3 months   Prior Functional Level Independent in ADLs/IADLs   Current Functional Level Independent in ADLs/IADLs   Can patient return to prior living arrangement Yes   Ability to make needs known: Good   Family able to assist with home care needs: Yes   Would you like for me to discuss the discharge plan with any other family members/significant others, and if so, who? No   Financial Resources Medicare   Social/Functional History   Lives With Spouse   Type of [address] [place] [address] Help From Family   ADL Assistance Independent   Homemaking Assistance Independent   Ambulation Assistance Independent   Transfer Assistance Independent   Active  Yes   Mode of Transportation Car   Discharge Planning   Living Arrangements Spouse/Significant Other   Current Services Prior To Admission None   Potential Assistance Needed N/A   DME Ordered? No   Potential Assistance Purchasing Medications No   Type of Home Care Services None   Patient expects to be discharged to: [address] Discharge   Services At/After Discharge None   [employer] Information Provided?  No   Mode of Transport at Discharge   (Spouse to transport home.)   Confirm Follow Up Transport Family

## 2022-12-04 NOTE — PROGRESS NOTES
Urology Progress Note    Admit Date: 12/1/2022    Subjective:     Patient has no new complaints. [place] out and he is voiding well; uop pink. Cardiology has signed off. Objective:     Patient Vitals for the past 8 hrs:   BP Temp Temp src Pulse Resp SpO2   12/04/22 0750 128/74 97.7 °F (36.5 °C) Oral 86 18 98 %   12/04/22 0501 (!) 144/81 98.6 °F (37 °C) Oral 76 18 98 %     12/04 0701 - 12/04 1900  In: -   Out: 100 [Urine:100]  12/02 1901 - 12/04 0700  In: 4000 [P.O.:1000]  Out: 9800 [Urine:9800]    Physical Exam:     Awake, alert, and oriented  Lungs no JVD. Breathing is  non-labored; no audible wheezing.     Heart regular, normal perfusion  Abd soft, nt, nd  UOP clear      Data Review   Recent Results (from the past 24 hour(s))   POCT Glucose    Collection Time: 12/04/22  7:48 AM   Result Value Ref Range    POC Glucose 201 (H) 65 - 100 mg/dL    Performed by: Elier    CBC with Auto Differential    Collection Time: 12/04/22  8:08 AM   Result Value Ref Range    WBC 7.0 4.3 - 11.1 K/uL    RBC 4.22 (L) 4.23 - 5.6 M/uL    Hemoglobin 12.8 (L) 13.6 - 17.2 g/dL    Hematocrit 39.9 (L) 41.1 - 50.3 %    MCV 94.5 82 - 102 FL    MCH 30.3 26.1 - 32.9 PG    MCHC 32.1 31.4 - 35.0 g/dL    RDW 13.2 11.9 - 14.6 %    Platelets 372 282 - 915 K/uL    MPV 10.5 9.4 - 12.3 FL    nRBC 0.00 0.0 - 0.2 K/uL    Differential Type AUTOMATED      Seg Neutrophils 69 43 - 78 %    Lymphocytes 17 13 - 44 %    Monocytes 9 4.0 - 12.0 %    Eosinophils % 4 0.5 - 7.8 %    Basophils 1 0.0 - 2.0 %    Immature Granulocytes 0 0.0 - 5.0 %    Segs Absolute 4.9 1.7 - 8.2 K/UL    Absolute Lymph # 1.2 0.5 - 4.6 K/UL    Absolute Mono # 0.6 0.1 - 1.3 K/UL    Absolute Eos # 0.3 0.0 - 0.8 K/UL    Basophils Absolute 0.1 0.0 - 0.2 K/UL    Absolute Immature Granulocyte 0.0 0.0 - 0.5 K/UL   Basic Metabolic Panel    Collection Time: 12/04/22  8:08 AM   Result Value Ref Range    Sodium 135 133 - 143 mmol/L    Potassium 3.7 3.5 - 5.1 mmol/L    Chloride 102 101 - 110 mmol/L    CO2 25 21 - 32 mmol/L    Anion Gap 8 2 - 11 mmol/L    Glucose 198 (H) 65 - 100 mg/dL    BUN 19 8 - 23 MG/DL    Creatinine 1.20 0.8 - 1.5 MG/DL    Est, Glom Filt Rate >60 >60 ml/min/1.73m2    Calcium 8.6 8.3 - 10.4 MG/DL           Assessment:     Principal Problem:    Benign prostatic hyperplasia with lower urinary tract symptoms  Active Problems:    Complete heart block (HCC)    BPH with obstruction/lower urinary tract symptoms    Acquired hypothyroidism    Controlled type 2 diabetes mellitus with diabetic neuropathy, without long-term current use of insulin (HCC)    Hypercholesterolemia    Essential hypertension  Resolved Problems:    * No resolved hospital problems.  *      S/p turp and pacemaker  Plan:     -d/c home; fu with Manny Huitron  -fu with cardiology      Will Herminio Recio MD    [facility] Urology  [address] [facility]

## 2022-12-04 NOTE — DISCHARGE INSTRUCTIONS
My office will schedule your follow-up appointment. Please call our office ([phone]) or return to ED if you experience fever > 101.5, severe pain, persistent nausea/vomiting, excessive bleeding, inability to urinate, or other concerns. Urology                                                                                                                   DEVICE IMPLANT FOLLOWUP INSTRUCTIONS  You will be discharged with an occlusive dressing over the incision site. This dressing will be removed at the 10 -14-day postop site check with the [address] Your new device will be checked at that visit and all your device teaching will be given. Keep the incision site dry until your follow up. Use a hand-held shower or bath. Do not submerge or soak in pools or tubs for 6 weeks. If you are discharged with a prescription for antibiotics, please take the full prescription until it is gone. After your site check, you may shower and get the incision site wet. You may let soap and water run on the incision and pat dry. Please do not apply creams, lotions or powders on or near the incision. Mild bruising and swelling can be normal after implant and will resolve in a few weeks. Call the office at [phone] if you have any of the following:  -Signs of infection, such as fever over 100 F, drainage from the incision, redness, significant swelling, or warmth at the incision site.  -Significant pain around the site that gets worse. Mild discomfort can be normal.   -Bleeding from the incision site  -Swelling in the arm on the side of the incision site  -Chest pain or shortness of breath     Your device has the capability of transmitting device information from home to our office using a home monitoring system or phone carla. The information will transmit through a cellular connection outside of your home. Your device data is reviewed during working hours to ensure proper device function.  You will be given your equipment and instruction upon your hospital discharge.                                                                       -You will be given a sling to wear upon discharge with instructions when it should be worn. -Do not lift the affected arm above the shoulder level, on the side the device was put in, for 4 weeks.   -Do not lift or push more than 5 to 10 pounds for 4 weeks on the affected side. Walking is fine and encouraged.   -Driving is restricted for 5-7 days. Long travel is not recommended until after your site check.    -Do not do any vigorous upper body activities, such as golfing, bowling tennis or mowing for about 6 weeks. -If you work, you may return to work. However, with limitations on lifting for 4 weeks.   -Please avoid any dental work or invasive procedures for 6 weeks, if possible, after implant. Please avoid strong magnetic fields, large power plant transformers and arc welders. Please stay 10 feet away of electromagnetic fields such as working over a large running engine, stereo speakers and large stereo systems. Home appliances are safe. You may operate any electrical or battery-operated device in your home. Modern Devices are seldom affected by normally operating home appliances, such as microwave ovens. Flying is safe and airport metal detectors will not affect your device, although your device may occasionally set off the metal detectors. If this happens, show the  your identification card. Security wanding over the device is contraindicated. Cellular Phones should be used with the hand opposite to the side where the device was implanted. The phone should not be carried in the pocket on the side of the device. CDL licenses are not renewed if you have a defibrillator implanted. Radiation Therapy should be avoided on or near the device. Should you require surgery in the future; some electrosurgical devices can interfere with the device function.  You should discuss this with your surgeon prior to any operation. If you are ordered an MRI, Please contact the clinic prior to ensure you have an MRI safe device. You must wait 6 weeks after implant before you may have an MRI. Tens units are contraindicated. Device Clinic [phone]                        Heart and Pacemaker: Anatomy Sketch     Current as of: January 10, 2022               Content Version: 13.4  © 2006-2022 [employer]. Care instructions adapted under license by [facility] ([facility] If you have questions about a medical condition or this instruction, always ask your healthcare professional. [address] any warranty or liability for your use of this information.

## 2022-12-14 ENCOUNTER — OFFICE VISIT (OUTPATIENT)
Dept: INTERNAL MEDICINE CLINIC | Facility: CLINIC | Age: 82
End: 2022-12-14
Payer: MEDICARE

## 2022-12-14 VITALS
DIASTOLIC BLOOD PRESSURE: 84 MMHG | HEIGHT: 71 IN | SYSTOLIC BLOOD PRESSURE: 137 MMHG | WEIGHT: 211 LBS | BODY MASS INDEX: 29.54 KG/M2

## 2022-12-14 DIAGNOSIS — E03.9 ACQUIRED HYPOTHYROIDISM: ICD-10-CM

## 2022-12-14 DIAGNOSIS — E11.40 CONTROLLED TYPE 2 DIABETES MELLITUS WITH DIABETIC NEUROPATHY, WITHOUT LONG-TERM CURRENT USE OF INSULIN (HCC): Primary | ICD-10-CM

## 2022-12-14 DIAGNOSIS — E11.40 CONTROLLED TYPE 2 DIABETES MELLITUS WITH DIABETIC NEUROPATHY, WITHOUT LONG-TERM CURRENT USE OF INSULIN (HCC): ICD-10-CM

## 2022-12-14 DIAGNOSIS — N40.1 BENIGN PROSTATIC HYPERPLASIA WITH LOWER URINARY TRACT SYMPTOMS, SYMPTOM DETAILS UNSPECIFIED: ICD-10-CM

## 2022-12-14 DIAGNOSIS — I10 ESSENTIAL HYPERTENSION: ICD-10-CM

## 2022-12-14 DIAGNOSIS — Z23 NEEDS FLU SHOT: ICD-10-CM

## 2022-12-14 LAB
EST. AVERAGE GLUCOSE BLD GHB EST-MCNC: 183 MG/DL
HBA1C MFR BLD: 8 % (ref 4.8–5.6)
T4 FREE SERPL-MCNC: 0.9 NG/DL (ref 0.78–1.46)
TSH W FREE THYROID IF ABNORMAL: 7.27 UIU/ML (ref 0.36–3.74)

## 2022-12-14 PROCEDURE — 3051F HG A1C>EQUAL 7.0%<8.0%: CPT | Performed by: INTERNAL MEDICINE

## 2022-12-14 PROCEDURE — G0008 ADMIN INFLUENZA VIRUS VAC: HCPCS | Performed by: INTERNAL MEDICINE

## 2022-12-14 PROCEDURE — 1123F ACP DISCUSS/DSCN MKR DOCD: CPT | Performed by: INTERNAL MEDICINE

## 2022-12-14 PROCEDURE — 99214 OFFICE O/P EST MOD 30 MIN: CPT | Performed by: INTERNAL MEDICINE

## 2022-12-14 PROCEDURE — 3078F DIAST BP <80 MM HG: CPT | Performed by: INTERNAL MEDICINE

## 2022-12-14 PROCEDURE — G8417 CALC BMI ABV UP PARAM F/U: HCPCS | Performed by: INTERNAL MEDICINE

## 2022-12-14 PROCEDURE — 1036F TOBACCO NON-USER: CPT | Performed by: INTERNAL MEDICINE

## 2022-12-14 PROCEDURE — G8427 DOCREV CUR MEDS BY ELIG CLIN: HCPCS | Performed by: INTERNAL MEDICINE

## 2022-12-14 PROCEDURE — 90694 VACC AIIV4 NO PRSRV 0.5ML IM: CPT | Performed by: INTERNAL MEDICINE

## 2022-12-14 PROCEDURE — 3074F SYST BP LT 130 MM HG: CPT | Performed by: INTERNAL MEDICINE

## 2022-12-14 PROCEDURE — G8484 FLU IMMUNIZE NO ADMIN: HCPCS | Performed by: INTERNAL MEDICINE

## 2022-12-14 PROCEDURE — 1111F DSCHRG MED/CURRENT MED MERGE: CPT | Performed by: INTERNAL MEDICINE

## 2022-12-14 ASSESSMENT — ENCOUNTER SYMPTOMS
COUGH: 0
SHORTNESS OF BREATH: 0

## 2022-12-14 NOTE — PROGRESS NOTES
SUBJECTIVE:   Naun Rico is a 81 y.o. male seen for a visit regarding   Chief Complaint   Patient presents with    Diabetes     Pt here for 4 month f/u     Hypothyroidism        Diabetes  He presents for his follow-up diabetic visit. He has type 2 diabetes mellitus. His disease course has been stable. Symptoms are stable. (Cr just done GFR over 60--had been hi 40's and 50's) Home blood sugar record trend: checks 3d straight--172,158,143-similar range before also. (A1C 7.7 in Aug from 8.2 prior)   Hypertension  This is a chronic problem. The current episode started more than 1 year ago. The problem is unchanged (last check 144 or 148). Pertinent negatives include no shortness of breath. Past Medical History, Past Surgical History, Family history, Social History, and Medications were all reviewed with the patient today and updated as necessary. Current Outpatient Medications   Medication Sig Dispense Refill    acetaminophen (TYLENOL) 500 MG tablet Take 500 mg by mouth every 6 hours as needed for Pain      amLODIPine (NORVASC) 5 MG tablet TAKE 1 TABLET BY MOUTH EVERY DAY (Patient taking differently: Take 5 mg by mouth daily) 90 tablet 3    atorvastatin (LIPITOR) 20 MG tablet Take 20 mg by mouth daily      bimatoprost (LUMIGAN) 0.01 % SOLN ophthalmic drops Place 1 drop into both eyes nightly      glipiZIDE-metFORMIN (METAGLIP) 5-500 MG per tablet Take 1 tablet by mouth 3 times daily 2 in the AM and 1 at HS      hydroCHLOROthiazide (HYDRODIURIL) 25 MG tablet Take 50 mg by mouth daily      ketoconazole (NIZORAL) 2 % cream Apply topically daily as needed      levothyroxine (SYNTHROID) 125 MCG tablet Take 125 mcg by mouth TAKE 2 TABS BY MOUTH DAILY BEFORE BREAKFAST  FOUR DAYS A WEEK Monday,TUESDAYS ,Wednesday AND Thursday and 125 NEXT 3 DAYS (FRI, SAT, SUN)      losartan (COZAAR) 100 MG tablet Take 100 mg by mouth daily       No current facility-administered medications for this visit.      No Known Allergies  Patient Active Problem List   Diagnosis    Benign prostatic hyperplasia with lower urinary tract symptoms    Elevated PSA    Acquired hypothyroidism    Urinary retention    Osteoarthritis of multiple joints    Hematuria    Constipation    Controlled type 2 diabetes mellitus with diabetic neuropathy, without long-term current use of insulin (HCC)    S/P total knee replacement    Hypercholesterolemia    Essential hypertension    Complete heart block (HCC)    BPH with obstruction/lower urinary tract symptoms     Social History     Tobacco Use    Smoking status: Former     Packs/day: 0.50     Years: 15.00     Pack years: 7.50     Types: Cigarettes     Quit date: 1988     Years since quittin.5    Smokeless tobacco: Never   Substance Use Topics    Alcohol use: No          Review of Systems   Respiratory:  Negative for cough and shortness of breath. Endocrine: Negative for cold intolerance and heat intolerance. Takes 2 tab of 125 dose 4 d , 1 tab on 3 days--TSH 6.90 Aug ; free 0.8(dropped some)--no change made   Genitourinary:  Positive for difficulty urinating and hematuria. Has large prostate by CT in past-sees Dr Rina Giles increase PSA-biopsy  benign , chronic prostatitis-just had TURP done        OBJECTIVE:  /84   Ht 5' 11\" (1.803 m)   Wt 211 lb (95.7 kg)   BMI 29.43 kg/m²      Physical Exam  Constitutional:       General: He is not in acute distress. Appearance: Normal appearance. Cardiovascular:      Rate and Rhythm: Normal rate and regular rhythm. Pulmonary:      Effort: Pulmonary effort is normal.          Medical problems and test results were reviewed with the patient today. ASSESSMENT and PLAN     1. Controlled type 2 diabetes mellitus with diabetic neuropathy, without long-term current use of insulin ([facility])  -     Hemoglobin A1C; Future  2. Essential hypertension  3. Acquired hypothyroidism  -     TSH with Reflex; Future  4.  Benign prostatic hyperplasia with lower urinary tract symptoms, symptom details unspecified  5. Needs flu shot  -     Influenza, FLUAD, (age 72 y+), IM, Preservative Free, 0.5 mL   Current thyroid at 1375 per week is 183 per day-if TSH similar may need 200 perday; check A1C -BP ok  lab results and schedule for future lab studies reviewed with patient  reviewed medications and side effects in detail     Return in about 4 months (around 4/14/2023) for for diabetic f/u.

## 2022-12-15 ENCOUNTER — OFFICE VISIT (OUTPATIENT)
Dept: UROLOGY | Age: 82
End: 2022-12-15
Payer: MEDICARE

## 2022-12-15 ENCOUNTER — TELEPHONE (OUTPATIENT)
Dept: INTERNAL MEDICINE CLINIC | Facility: CLINIC | Age: 82
End: 2022-12-15

## 2022-12-15 DIAGNOSIS — N13.8 BPH WITH OBSTRUCTION/LOWER URINARY TRACT SYMPTOMS: Primary | ICD-10-CM

## 2022-12-15 DIAGNOSIS — N40.1 BPH WITH OBSTRUCTION/LOWER URINARY TRACT SYMPTOMS: Primary | ICD-10-CM

## 2022-12-15 DIAGNOSIS — E03.9 ACQUIRED HYPOTHYROIDISM: Primary | ICD-10-CM

## 2022-12-15 LAB
BILIRUBIN, URINE, POC: NORMAL
BLOOD URINE, POC: NORMAL
GLUCOSE URINE, POC: NEGATIVE
KETONES, URINE, POC: NORMAL
LEUKOCYTE ESTERASE, URINE, POC: NORMAL
NITRITE, URINE, POC: NEGATIVE
PH, URINE, POC: 6.5 (ref 4.6–8)
PROTEIN,URINE, POC: 300
SPECIFIC GRAVITY, URINE, POC: 1.03 (ref 1–1.03)
URINALYSIS CLARITY, POC: NORMAL
URINALYSIS COLOR, POC: NORMAL
UROBILINOGEN, POC: NORMAL

## 2022-12-15 PROCEDURE — 99024 POSTOP FOLLOW-UP VISIT: CPT | Performed by: NURSE PRACTITIONER

## 2022-12-15 PROCEDURE — 81003 URINALYSIS AUTO W/O SCOPE: CPT | Performed by: NURSE PRACTITIONER

## 2022-12-15 RX ORDER — LEVOTHYROXINE SODIUM 0.2 MG/1
200 TABLET ORAL DAILY
Qty: 90 TABLET | Refills: 3 | Status: SHIPPED | OUTPATIENT
Start: 2022-12-15

## 2022-12-15 ASSESSMENT — ENCOUNTER SYMPTOMS
BACK PAIN: 0
ABDOMINAL PAIN: 0

## 2022-12-15 NOTE — TELEPHONE ENCOUNTER
Pt notified of lab results per Dr. Helen Kenny. Agreed to change to levothyroxine 200 mcg daily per Dr. Helen Kenny.   Can you send in rx to [facility] on [address]?

## 2022-12-15 NOTE — PROGRESS NOTES
[facility]  [address] [facility]  [address] [facility], [address]  [phone]          Masood Leigh  : 1940    Chief Complaint   Patient presents with    Follow-up     Post Op-TURP          HPI     Masood Leigh is a 81 y.o. male s/p monopolar TURP w Dr Gris Patino 22. Path benign. He is NOT on prostate medication. Voiding well. Occ hematuria. No fever/chills. He is doing great. Past Medical History:   Diagnosis Date    Chronic pain     arthritic    Complete heart block ([address] 2022    Enlarged prostate     \"improved since TURP\"    Enlarged prostate with lower urinary tract symptoms (LUTS)     Former cigarette smoker     Hypercholesteremia     Hypertension     well controlled by valsartan/hctz    Hypothyroidism          Obesity (BMI 30.0-34. 9)     BMI 31    Osteoarthritis          Type 2 diabetes mellitus ([address]     oral agent only/AVG BS:110/no s.s of hypoglycemia/Last A1c: 7.7    Vitamin B 12 deficiency     Vitamin D deficiency      Past Surgical History:   Procedure Laterality Date    CHOLECYSTECTOMY, LAPAROSCOPIC      COLONOSCOPY  2017    EP DEVICE PROCEDURE N/A 2022    INSERT PPM DUAL performed by Ayanna Pickard MD at [address] [place] [address] LAB    OTHER SURGICAL HISTORY      prostate laser surgery \"before the TURP\"    TOTAL KNEE ARTHROPLASTY Right 2015    TURP  2013    TURP N/A 2022    CYSTOSCOPY MONOPOLAR TRANSURETHRAL RESECTION PROSTATE performed by Marisela Gallo MD at [facility] MAIN OR     Current Outpatient Medications   Medication Sig Dispense Refill    levothyroxine (SYNTHROID) 200 MCG tablet Take 1 tablet by mouth daily 90 tablet 3    acetaminophen (TYLENOL) 500 MG tablet Take 500 mg by mouth every 6 hours as needed for Pain      amLODIPine (NORVASC) 5 MG tablet TAKE 1 TABLET BY MOUTH EVERY DAY (Patient taking differently: Take 5 mg by mouth daily) 90 tablet 3    atorvastatin (LIPITOR) 20 MG tablet Take 20 mg by mouth daily      bimatoprost (LUMIGAN) 0.01 % SOLN ophthalmic drops Place 1 drop into both eyes nightly      glipiZIDE-metFORMIN (METAGLIP) 5-500 MG per tablet Take 1 tablet by mouth 3 times daily 2 in the AM and 1.5 at HS      hydroCHLOROthiazide (HYDRODIURIL) 25 MG tablet Take 50 mg by mouth daily      ketoconazole (NIZORAL) 2 % cream Apply topically daily as needed      losartan (COZAAR) 100 MG tablet Take 100 mg by mouth daily       No current facility-administered medications for this visit.      No Known Allergies  Social History     Socioeconomic History    Marital status:      Spouse name: Not on file    Number of children: Not on file    Years of education: Not on file    Highest education level: Not on file   Occupational History    Not on file   Tobacco Use    Smoking status: Former     Packs/day: 0.50     Years: 15.00     Pack years: 7.50     Types: Cigarettes     Quit date: 1988     Years since quittin.5    Smokeless tobacco: Never   Vaping Use    Vaping Use: Never used   Substance and Sexual Activity    Alcohol use: No    Drug use: No    Sexual activity: Not on file   Other Topics Concern    Not on file   Social History Narrative    Not on file     Social Determinants of Health     Financial Resource Strain: Not on file   Food Insecurity: Not on file   Transportation Needs: Not on file   Physical Activity: Not on file   Stress: Not on file   Social Connections: Not on file   Intimate Partner Violence: Not on file   Housing Stability: Not on file     Family History   Problem Relation Age of Onset    Cancer Mother     Cancer Father     Hypertension Father     Cancer Sister     Hypertension Brother     Stroke Brother     Diabetes Sister     Hypertension Sister     Hypertension Sister     Hypertension Sister     Hypertension Sister     Hypertension Sister     Hypertension Brother     Stroke Brother     Hypertension Brother     Stroke Brother     Hypertension Brother     Stroke Brother        Review of Systems  Constitutional: Negative for fever. GI:  Negative for abdominal pain. Genitourinary: Positive for hematuria. Musculoskeletal:  Negative for back pain. Urinalysis  UA - Dipstick  Results for orders placed or performed in visit on 12/15/22   AMB POC URINALYSIS DIP STICK AUTO W/O MICRO   Result Value Ref Range    Color (UA POC)      Clarity (UA POC)      Glucose, Urine, POC Negative Negative    Bilirubin, Urine, POC Small Negative    KETONES, Urine, POC Trace Negative    Specific Gravity, Urine, POC 1.030 1.001 - 1.035    Blood (UA POC) Large Negative    pH, Urine, POC 6.5 4.6 - 8.0    Protein, Urine,  Negative    Urobilinogen, POC 1 mg/dL     Nitrite, Urine, POC Negative Negative    Leukocyte Esterase, Urine, POC Large Negative       UA - Micro  WBC - 0  RBC - 0  Bacteria - 0  Epith - 0    PHYSICAL EXAM    General appearance - well appearing and in no distress  Mental status - alert, oriented to person, place, and time  Neck - supple, no significant adenopathy  Chest/Lung-  Quiet, even and easy respiratory effort without use of accessory muscles  Skin - normal coloration and turgor, no rashes      Assessment and Plan    ICD-10-CM    1. BPH with obstruction/lower urinary tract symptoms  N40.1 AMB POC URINALYSIS DIP STICK AUTO W/O MICRO    N13.8           Urine w hematuria but no infection. Patient doing well after TURP. Path reviewed. Home care reviewed. RTO in 3 months for follow up. Advised to call sooner if sx worsen. Jyothi Beasley, REAGANP, APRN - CNP  Dr. Linden Cox is supervising physician today and he approves plan of care.

## 2023-01-04 NOTE — DISCHARGE SUMMARY
[address] [facility]  DISCHARGE SUMMARY    Name:  Ibis Jones  MR#:  [id number]  :  1940  ACCOUNT #:  [de-identified]  ADMIT DATE:  2022  DISCHARGE DATE:  2022    PROCEDURE DURING ADMISSION:  Transurethral resection of prostate. HISTORY OF PRESENT ILLNESS:  An 23-year-old male with history of BPH. He had a previous microwave thermotherapy done, but had recurrent symptoms with recurrent hematuria. Workup showed a very large intravesical prostate. He underwent transurethral resection in , with path showing 12 g resected. He continued to have LUTS with PSA of 8.9 in . Cystoscopy in 2017 showed a huge intravesical prostate lobe anteriorly. He is continued to have worsening lower urinary tract symptoms with an AUA symptom score of 22 and bad urgency. He has been on tamsulosin and finasteride. His prostate was measured at 112 g. Because of his urinary tract symptoms, we discussed treatment options. He elected to have monopolar TURP. PHYSICAL EXAMINATION:  LUNGS:  Clear. HEART:  Regular rate and rhythm. ABDOMEN:  Soft, nontender. HOSPITAL COURSE:  He underwent TURP and was discharged home on 2022.       Edward Brewster MD      [id number]  D:  2023 15:07  T:  2023 22:48  JOB #:  [id number]

## 2023-03-17 ENCOUNTER — OFFICE VISIT (OUTPATIENT)
Dept: UROLOGY | Age: 83
End: 2023-03-17

## 2023-03-17 DIAGNOSIS — R97.20 ELEVATED PSA: ICD-10-CM

## 2023-03-17 DIAGNOSIS — N13.8 BPH WITH OBSTRUCTION/LOWER URINARY TRACT SYMPTOMS: ICD-10-CM

## 2023-03-17 DIAGNOSIS — N40.1 BPH WITH OBSTRUCTION/LOWER URINARY TRACT SYMPTOMS: ICD-10-CM

## 2023-03-17 DIAGNOSIS — R39.89 URINARY PROBLEM: Primary | ICD-10-CM

## 2023-03-17 LAB
BILIRUBIN, URINE, POC: NEGATIVE
BLOOD URINE, POC: ABNORMAL
GLUCOSE URINE, POC: NEGATIVE
KETONES, URINE, POC: NEGATIVE
LEUKOCYTE ESTERASE, URINE, POC: ABNORMAL
NITRITE, URINE, POC: NEGATIVE
PH, URINE, POC: 6 (ref 4.6–8)
PROTEIN,URINE, POC: 100
SPECIFIC GRAVITY, URINE, POC: 1.02 (ref 1–1.03)
URINALYSIS CLARITY, POC: ABNORMAL
URINALYSIS COLOR, POC: ABNORMAL
UROBILINOGEN, POC: NORMAL

## 2023-03-17 RX ORDER — UBIDECARENONE 75 MG
50 CAPSULE ORAL DAILY
COMMUNITY

## 2023-03-17 NOTE — PROGRESS NOTES
[facility]  [address]  [address] [facility], [address] [facility]  [phone]    Soraida Rodriguez  : 1940    Chief Complaint   Patient presents with    Follow-up    Benign Prostatic Hypertrophy        HPI     Soraida Rodriguez is a 81 y.o. male   History of BPH, microwave thermotherapy treatment of the prostate in the past. The patient had been taking Avodart chronically. In  started having some gross hematuria, some dysuria. Went to   an  facility and was given an antibiotic. CT was obtained. CT showed  IMPRESSION:   1. Large mass in the bladder with circumferential wall   thickening. Although this may simply represent hematoma or blood   clot, neoplasm must be strongly considered. Recommend correlation   with direct visualization at cystoscopy. 2. A 2 mm nonobstructing right peripelvic renal stone. There is   no hydronephrosis or hydroureter. 3. Lobular hyperplasia of the left adrenal gland. 4. Indeterminate 1.5 x 1.0 cm right iliac lymph node. 5. Indeterminate tiny sclerotic foci about the pelvis may simple   represent bone islands. Cysto in office showed large BPH. Had TURP 14 . path shows 12   gm BPH and chronic prostatitis. Has stopped the Avodart. Is voiding much better, but having some urgency, double voiding,   nocturia x 3. Cysto showed S/p TURP but with large anterior lobe, both   intravesical and partially obstructing the prostate fossa. ,   without bladder neck contracture. We had discussed repeat TURP. He stated that he would rather take meds. He is on Tamsulosin and LUTS are much better. In  had  gross painless hematuria on 3 occasions. Had CT w/wo on 17: IMPRESSION:   1. The only potential etiology for the patient's hematuria is that there is  severe enlargement of the prostate gland which has slightly worsened when  compared to the prior CT study.  No evidence for stone disease, enhancing renal  cortical lesion, or focal urinary bladder wall lesion is otherwise seen. Hematuria has resolved. PSA 8.9 in 2015. Cysto in 3-17 showed s/p TURP, huge intravesical prostate lobe anteriorly. He was started on Finasteride in addition to Tamsulosin. ? Hematuria felt to be  from massive BPH. . If he comes to surgery , may need open prostatectomy. He is voiding well. PSA 5.7 in 9-17. PSA 3.9 in 3-18. Had  microscopic hematuria in 4-18; had negative urine cytology. PSA 4.2 in 9-18. He called on 9-25-18 after having gross hematuria. This stopped, currently without problems. Had Staph UTI in 10-18. PSA 3.2 in 9-19, corrected to 6.4 .   . Remains on tamsulosin and finasteride. Has been on finasteride since 2017. PSA 4.1 in 9-20, corrected to 8.2   Has tried Cialis 5 mg daily with no improvement in ED. ALEX in 10-20 showed 4+ prostate without nodules. PSA 5.4 in 3-21, corrected to 10.8. He is having some urge incontinence. Wears a pad. Slow rise in PSA. Urge incontinence is bothering him. Continues tamsulosin and finasteride. Cysto in 4-21 showed huge intravesical lobe     PVR 8 ml by US  AUASS is 22 , with highest score of 5 for \"urgency\". QOL is \"pleased\". Added Detrol to tamsulosin and finasteride in 4-21. He states that his voiding is not a problem. PVR 52 ml by US today. PSA 5.5, corrected to 11.0. PSA 5.2 in 9-22, corrected to 10.4. He states that he is having urgency and urge incontinence. PVR in 10-22 by US is >120 ml.      CT from 2021 shows huge prostate with large intravesical component. is retaining urine. stopped the Detrol. Prostate 112 gm by US. S/p TURP 12-2-22. Path:  gm. He is voiding with good stream. No hematuria.      Past Medical History:   Diagnosis Date    Chronic pain     arthritic    Complete heart block ([address] 12/1/2022    Enlarged prostate     \"improved since TURP\"    Enlarged prostate with lower urinary tract symptoms (LUTS)     Former cigarette smoker Hypercholesteremia     Hypertension     well controlled by valsartan/hctz    Hypothyroidism          Obesity (BMI 30.0-34. 9)     BMI 31    Osteoarthritis          Type 2 diabetes mellitus ([facility]     oral agent only/AVG BS:110/no s.s of hypoglycemia/Last A1c: 7.7    Vitamin B 12 deficiency     Vitamin D deficiency      Past Surgical History:   Procedure Laterality Date    CHOLECYSTECTOMY, LAPAROSCOPIC  2007    COLONOSCOPY  08/09/2017    EP DEVICE PROCEDURE N/A 12/2/2022    INSERT PPM DUAL performed by Lisa Gasca MD at [address] [place] [address]    OTHER SURGICAL HISTORY      prostate laser surgery \"before the TURP\"    TOTAL KNEE ARTHROPLASTY Right 02/2015    TURP  6/2013    TURP N/A 12/1/2022    CYSTOSCOPY MONOPOLAR TRANSURETHRAL RESECTION PROSTATE performed by Mario Infante MD at [facility] MAIN OR     Current Outpatient Medications   Medication Sig Dispense Refill    vitamin B-12 (CYANOCOBALAMIN) 100 MCG tablet Take 50 mcg by mouth daily      levothyroxine (SYNTHROID) 200 MCG tablet Take 1 tablet by mouth daily 90 tablet 3    acetaminophen (TYLENOL) 500 MG tablet Take 500 mg by mouth every 6 hours as needed for Pain      amLODIPine (NORVASC) 5 MG tablet TAKE 1 TABLET BY MOUTH EVERY DAY (Patient taking differently: Take 5 mg by mouth daily) 90 tablet 3    atorvastatin (LIPITOR) 20 MG tablet Take 20 mg by mouth daily      bimatoprost (LUMIGAN) 0.01 % SOLN ophthalmic drops Place 1 drop into both eyes nightly      glipiZIDE-metFORMIN (METAGLIP) 5-500 MG per tablet Take 1 tablet by mouth 3 times daily 2 in the AM and 1.5 at HS      hydroCHLOROthiazide (HYDRODIURIL) 25 MG tablet Take 50 mg by mouth daily      ketoconazole (NIZORAL) 2 % cream Apply topically daily as needed      losartan (COZAAR) 100 MG tablet Take 100 mg by mouth daily       No current facility-administered medications for this visit.      No Known Allergies  Social History     Socioeconomic History    Marital status:      Spouse name: Not on file    Number of children: Not on file    Years of education: Not on file    Highest education level: Not on file   Occupational History    Not on file   Tobacco Use    Smoking status: Former     Packs/day: 0.50     Years: 15.00     Pack years: 7.50     Types: Cigarettes     Quit date: 1988     Years since quittin.8    Smokeless tobacco: Never   Vaping Use    Vaping Use: Never used   Substance and Sexual Activity    Alcohol use: No    Drug use: No    Sexual activity: Not on file   Other Topics Concern    Not on file   Social History Narrative    Not on file     Social Determinants of Health     Financial Resource Strain: Not on file   Food Insecurity: Not on file   Transportation Needs: Not on file   Physical Activity: Not on file   Stress: Not on file   Social Connections: Not on file   Intimate Partner Violence: Not on file   Housing Stability: Not on file     Family History   Problem Relation Age of Onset    Cancer Mother     Cancer Father     Hypertension Father     Cancer Sister     Hypertension Brother     Stroke Brother     Diabetes Sister     Hypertension Sister     Hypertension Sister     Hypertension Sister     Hypertension Sister     Hypertension Sister     Hypertension Brother     Stroke Brother     Hypertension Brother     Stroke Brother     Hypertension Brother     Stroke Brother        ROS    There were no vitals taken for this visit.     Urinalysis  UA - Dipstick  Results for orders placed or performed in visit on 23   AMB POC URINALYSIS DIP STICK AUTO W/O MICRO   Result Value Ref Range    Color, Urine, POC      Clarity, Urine, POC      Glucose, Urine, POC Negative Negative    Bilirubin, Urine, POC Negative Negative    Ketones, Urine, POC Negative Negative    Specific Gravity, Urine, POC 1.025 1.001 - 1.035    Blood, Urine, POC Moderate Negative    pH, Urine, POC 6.0 4.6 - 8.0    Protein, Urine,  Negative    Urobilinogen, POC Normal     Nitrite, Urine, POC Negative Negative Leukocyte Esterase, Urine, POC Trace Negative   Results for orders placed or performed in visit on 12/15/22   AMB POC URINALYSIS DIP STICK AUTO W/O MICRO   Result Value Ref Range    Color (UA POC)      Clarity (UA POC)      Glucose, Urine, POC Negative Negative    Bilirubin, Urine, POC Small Negative    KETONES, Urine, POC Trace Negative    Specific Gravity, Urine, POC 1.030 1.001 - 1.035    Blood (UA POC) Large Negative    pH, Urine, POC 6.5 4.6 - 8.0    Protein, Urine,  Negative    Urobilinogen, POC 1 mg/dL     Nitrite, Urine, POC Negative Negative    Leukocyte Esterase, Urine, POC Large Negative       UA - Micro  WBC - 0  RBC - tntc  Bacteria - 0  Epith - 0    Physical Exam    Assessment and Plan    Kariem López was seen today for follow-up and benign prostatic hypertrophy. Diagnoses and all orders for this visit:    Urinary problem  -     AMB POC URINALYSIS DIP STICK AUTO W/O MICRO    BPH with obstruction/lower urinary tract symptoms    Elevated PSA  -     PSA, Diagnostic; Future     Doing well after TURP. Needs follow up PSA. Follow-up and Dispositions    Return in about 3 months (around 6/17/2023) for appt, PSA before.

## 2023-03-29 ENCOUNTER — OFFICE VISIT (OUTPATIENT)
Dept: INTERNAL MEDICINE CLINIC | Facility: CLINIC | Age: 83
End: 2023-03-29
Payer: MEDICARE

## 2023-03-29 VITALS
WEIGHT: 219 LBS | HEIGHT: 71 IN | DIASTOLIC BLOOD PRESSURE: 78 MMHG | BODY MASS INDEX: 30.66 KG/M2 | SYSTOLIC BLOOD PRESSURE: 138 MMHG

## 2023-03-29 DIAGNOSIS — L72.3 SEBACEOUS CYST: Primary | ICD-10-CM

## 2023-03-29 PROCEDURE — 99212 OFFICE O/P EST SF 10 MIN: CPT | Performed by: INTERNAL MEDICINE

## 2023-03-29 PROCEDURE — 3074F SYST BP LT 130 MM HG: CPT | Performed by: INTERNAL MEDICINE

## 2023-03-29 PROCEDURE — 1123F ACP DISCUSS/DSCN MKR DOCD: CPT | Performed by: INTERNAL MEDICINE

## 2023-03-29 PROCEDURE — G8484 FLU IMMUNIZE NO ADMIN: HCPCS | Performed by: INTERNAL MEDICINE

## 2023-03-29 PROCEDURE — G8427 DOCREV CUR MEDS BY ELIG CLIN: HCPCS | Performed by: INTERNAL MEDICINE

## 2023-03-29 PROCEDURE — 1036F TOBACCO NON-USER: CPT | Performed by: INTERNAL MEDICINE

## 2023-03-29 PROCEDURE — G8417 CALC BMI ABV UP PARAM F/U: HCPCS | Performed by: INTERNAL MEDICINE

## 2023-03-29 PROCEDURE — 3078F DIAST BP <80 MM HG: CPT | Performed by: INTERNAL MEDICINE

## 2023-03-29 ASSESSMENT — PATIENT HEALTH QUESTIONNAIRE - PHQ9
2. FEELING DOWN, DEPRESSED OR HOPELESS: 0
SUM OF ALL RESPONSES TO PHQ9 QUESTIONS 1 & 2: 0
SUM OF ALL RESPONSES TO PHQ QUESTIONS 1-9: 0
SUM OF ALL RESPONSES TO PHQ QUESTIONS 1-9: 0
1. LITTLE INTEREST OR PLEASURE IN DOING THINGS: 0
SUM OF ALL RESPONSES TO PHQ QUESTIONS 1-9: 0
SUM OF ALL RESPONSES TO PHQ QUESTIONS 1-9: 0

## 2023-03-30 PROBLEM — L72.3 SEBACEOUS CYST: Status: ACTIVE | Noted: 2023-03-30

## 2023-03-30 ASSESSMENT — ENCOUNTER SYMPTOMS: ROS SKIN COMMENTS: LUMP

## 2023-03-30 NOTE — PROGRESS NOTES
Exam  Chest:          Comments: 2 mc tender raised with expressible put       Medical problems and test results were reviewed with the patient today. Recent Results (from the past 672 hour(s))   AMB POC URINALYSIS DIP STICK AUTO W/O MICRO    Collection Time: 03/17/23 11:30 AM   Result Value Ref Range    Color, Urine, POC      Clarity, Urine, POC      Glucose, Urine, POC Negative Negative    Bilirubin, Urine, POC Negative Negative    Ketones, Urine, POC Negative Negative    Specific Gravity, Urine, POC 1.025 1.001 - 1.035    Blood, Urine, POC Moderate Negative    pH, Urine, POC 6.0 4.6 - 8.0    Protein, Urine,  Negative    Urobilinogen, POC Normal     Nitrite, Urine, POC Negative Negative    Leukocyte Esterase, Urine, POC Trace Negative         ASSESSMENT and PLAN    1. Sebaceous cyst     Reassured, and he will apply moist heat and work on expressing purulent material for resolution    No follow-ups on file.

## 2023-04-02 ASSESSMENT — ENCOUNTER SYMPTOMS
DIARRHEA: 0
ABDOMINAL PAIN: 0
PHOTOPHOBIA: 0
SHORTNESS OF BREATH: 0
COUGH: 0
CONSTIPATION: 0
ABDOMINAL DISTENTION: 0
SORE THROAT: 0

## 2023-04-02 NOTE — PROGRESS NOTES
(METAGLIP) 5-500 MG per tablet 2 in the AM and 1.5 at HS      hydroCHLOROthiazide (HYDRODIURIL) 25 MG tablet Take 2 tablets by mouth daily      losartan (COZAAR) 100 MG tablet Take 1 tablet by mouth daily      ketoconazole (NIZORAL) 2 % cream Apply topically daily as needed (Patient not taking: Reported on 4/4/2023)       No current facility-administered medications for this visit.             No Known Allergies    Patient Active Problem List    Diagnosis Date Noted    Complete heart block ([facility] 12/01/2022     Priority: High    BPH with obstruction/lower urinary tract symptoms 12/01/2022     Priority: Medium    NSVT (nonsustained ventricular tachycardia) ([facility] 04/04/2023    Sebaceous cyst 03/30/2023    Hematuria 04/02/2018    Hypercholesterolemia 01/30/2018    Constipation 06/28/2017    Elevated PSA 03/31/2017    Acquired hypothyroidism 01/25/2017    Controlled type 2 diabetes mellitus with diabetic neuropathy, without long-term current use of insulin ([facility] 01/25/2017    Essential hypertension 01/25/2017    Osteoarthritis of multiple joints 02/19/2015    S/P total knee replacement 02/19/2015    Benign prostatic hyperplasia with lower urinary tract symptoms 06/10/2014    Urinary retention 06/10/2014        Past Surgical History:   Procedure Laterality Date    CHOLECYSTECTOMY, LAPAROSCOPIC  2007    COLONOSCOPY  08/09/2017    EP DEVICE PROCEDURE N/A 12/2/2022    INSERT PPM DUAL performed by Orlin Smallwood MD at [address] [place] [address]    OTHER SURGICAL HISTORY      prostate laser surgery \"before the TURP\"    TOTAL KNEE ARTHROPLASTY Right 02/2015    TURP  6/2013    TURP N/A 12/1/2022    CYSTOSCOPY MONOPOLAR TRANSURETHRAL RESECTION PROSTATE performed by Neville Jenkins MD at [facility] MAIN OR       Family History   Problem Relation Age of Onset    Cancer Mother     Cancer Father     Hypertension Father     Cancer Sister     Hypertension Brother     Stroke Brother     Diabetes Sister     Hypertension Sister

## 2023-04-04 ENCOUNTER — OFFICE VISIT (OUTPATIENT)
Dept: CARDIOLOGY CLINIC | Age: 83
End: 2023-04-04
Payer: MEDICARE

## 2023-04-04 VITALS
DIASTOLIC BLOOD PRESSURE: 62 MMHG | SYSTOLIC BLOOD PRESSURE: 128 MMHG | HEART RATE: 80 BPM | BODY MASS INDEX: 30.52 KG/M2 | WEIGHT: 218 LBS | HEIGHT: 71 IN

## 2023-04-04 DIAGNOSIS — E78.00 HYPERCHOLESTEROLEMIA: ICD-10-CM

## 2023-04-04 DIAGNOSIS — E11.40 CONTROLLED TYPE 2 DIABETES MELLITUS WITH DIABETIC NEUROPATHY, WITHOUT LONG-TERM CURRENT USE OF INSULIN (HCC): ICD-10-CM

## 2023-04-04 DIAGNOSIS — I44.2 COMPLETE HEART BLOCK (HCC): Primary | ICD-10-CM

## 2023-04-04 DIAGNOSIS — E03.9 ACQUIRED HYPOTHYROIDISM: ICD-10-CM

## 2023-04-04 DIAGNOSIS — I47.29 NSVT (NONSUSTAINED VENTRICULAR TACHYCARDIA) (HCC): ICD-10-CM

## 2023-04-04 DIAGNOSIS — I10 ESSENTIAL HYPERTENSION: ICD-10-CM

## 2023-04-04 PROCEDURE — G8417 CALC BMI ABV UP PARAM F/U: HCPCS | Performed by: INTERNAL MEDICINE

## 2023-04-04 PROCEDURE — 99214 OFFICE O/P EST MOD 30 MIN: CPT | Performed by: INTERNAL MEDICINE

## 2023-04-04 PROCEDURE — 1036F TOBACCO NON-USER: CPT | Performed by: INTERNAL MEDICINE

## 2023-04-04 PROCEDURE — 3074F SYST BP LT 130 MM HG: CPT | Performed by: INTERNAL MEDICINE

## 2023-04-04 PROCEDURE — 3078F DIAST BP <80 MM HG: CPT | Performed by: INTERNAL MEDICINE

## 2023-04-04 PROCEDURE — 1123F ACP DISCUSS/DSCN MKR DOCD: CPT | Performed by: INTERNAL MEDICINE

## 2023-04-04 PROCEDURE — G8427 DOCREV CUR MEDS BY ELIG CLIN: HCPCS | Performed by: INTERNAL MEDICINE

## 2023-04-05 LAB
ANION GAP SERPL CALC-SCNC: 6 MMOL/L (ref 2–11)
BUN SERPL-MCNC: 18 MG/DL (ref 8–23)
CALCIUM SERPL-MCNC: 9 MG/DL (ref 8.3–10.4)
CHLORIDE SERPL-SCNC: 109 MMOL/L (ref 101–110)
CO2 SERPL-SCNC: 27 MMOL/L (ref 21–32)
CREAT SERPL-MCNC: 1.2 MG/DL (ref 0.8–1.5)
GLUCOSE SERPL-MCNC: 107 MG/DL (ref 65–100)
MAGNESIUM SERPL-MCNC: 1.9 MG/DL (ref 1.8–2.4)
POTASSIUM SERPL-SCNC: 3.8 MMOL/L (ref 3.5–5.1)
SODIUM SERPL-SCNC: 142 MMOL/L (ref 133–143)
TSH, 3RD GENERATION: 10.1 UIU/ML (ref 0.36–3.74)

## 2023-04-20 ENCOUNTER — TELEPHONE (OUTPATIENT)
Dept: CARDIOLOGY CLINIC | Age: 83
End: 2023-04-20

## 2023-04-20 NOTE — TELEPHONE ENCOUNTER
----- Message from Mary Melendez MD sent at 4/5/2023 11:31 AM EDT -----  TSH is very high. May not be on enough thyroid medication. Emphasize compliance with thyroid medication on daily basis and forward this to his PCP for further evaluation and treatment as needed.   ----- Message -----  From: Niall Hager Incoming [place] W[employer] [employer]  Sent: 4/5/2023   2:34 AM EDT  To: Mary Melendez MD

## 2023-04-21 NOTE — TELEPHONE ENCOUNTER
Called patient who voiced understanding of Dr. Lion Guevara' response. Called patient who states that he saw Dr. Pedro Busch 4/14/23 and he increased his Levothyroxine to 200 mg 1 qd.

## 2023-04-25 PROCEDURE — 93294 REM INTERROG EVL PM/LDLS PM: CPT | Performed by: INTERNAL MEDICINE

## 2023-04-25 PROCEDURE — 93296 REM INTERROG EVL PM/IDS: CPT | Performed by: INTERNAL MEDICINE

## 2023-05-04 DIAGNOSIS — I10 ESSENTIAL (PRIMARY) HYPERTENSION: ICD-10-CM

## 2023-05-04 DIAGNOSIS — I10 ESSENTIAL HYPERTENSION: ICD-10-CM

## 2023-05-04 DIAGNOSIS — E11.40 CONTROLLED TYPE 2 DIABETES MELLITUS WITH DIABETIC NEUROPATHY, WITHOUT LONG-TERM CURRENT USE OF INSULIN (HCC): Primary | ICD-10-CM

## 2023-05-04 DIAGNOSIS — E78.00 HYPERCHOLESTEROLEMIA: ICD-10-CM

## 2023-05-05 RX ORDER — LEVOTHYROXINE SODIUM 0.12 MG/1
TABLET ORAL
Qty: 142 TABLET | OUTPATIENT
Start: 2023-05-05

## 2023-05-05 RX ORDER — AMLODIPINE BESYLATE 5 MG/1
5 TABLET ORAL DAILY
Qty: 90 TABLET | Refills: 4 | Status: SHIPPED | OUTPATIENT
Start: 2023-05-05

## 2023-05-05 RX ORDER — TOLTERODINE 4 MG/1
CAPSULE, EXTENDED RELEASE ORAL
Qty: 90 CAPSULE | OUTPATIENT
Start: 2023-05-05

## 2023-05-05 RX ORDER — GLIPIZIDE AND METFORMIN HCL 5; 500 MG/1; MG/1
TABLET, FILM COATED ORAL
Qty: 315 TABLET | Refills: 3 | Status: SHIPPED | OUTPATIENT
Start: 2023-05-05

## 2023-05-05 RX ORDER — LOSARTAN POTASSIUM 100 MG/1
TABLET ORAL
Qty: 90 TABLET | Refills: 3 | Status: SHIPPED | OUTPATIENT
Start: 2023-05-05

## 2023-05-05 RX ORDER — FINASTERIDE 5 MG/1
TABLET, FILM COATED ORAL
Qty: 90 TABLET | OUTPATIENT
Start: 2023-05-05

## 2023-05-05 RX ORDER — ATORVASTATIN CALCIUM 20 MG/1
TABLET, FILM COATED ORAL
Qty: 90 TABLET | Refills: 3 | Status: SHIPPED | OUTPATIENT
Start: 2023-05-05

## 2023-05-05 RX ORDER — HYDROCHLOROTHIAZIDE 25 MG/1
50 TABLET ORAL DAILY
Qty: 180 TABLET | Refills: 3 | Status: SHIPPED | OUTPATIENT
Start: 2023-05-05

## 2023-05-14 ASSESSMENT — ENCOUNTER SYMPTOMS
CONSTIPATION: 0
PHOTOPHOBIA: 0
DIARRHEA: 0
ABDOMINAL DISTENTION: 0
SORE THROAT: 0
ABDOMINAL PAIN: 0
COUGH: 0
SHORTNESS OF BREATH: 0

## 2023-05-18 ENCOUNTER — OFFICE VISIT (OUTPATIENT)
Age: 83
End: 2023-05-18
Payer: MEDICARE

## 2023-05-18 VITALS
DIASTOLIC BLOOD PRESSURE: 70 MMHG | HEART RATE: 76 BPM | BODY MASS INDEX: 30.38 KG/M2 | HEIGHT: 71 IN | SYSTOLIC BLOOD PRESSURE: 132 MMHG | WEIGHT: 217 LBS

## 2023-05-18 DIAGNOSIS — I10 ESSENTIAL HYPERTENSION: Primary | ICD-10-CM

## 2023-05-18 DIAGNOSIS — R94.39 ABNORMAL NUCLEAR STRESS TEST: ICD-10-CM

## 2023-05-18 DIAGNOSIS — I47.29 NSVT (NONSUSTAINED VENTRICULAR TACHYCARDIA) (HCC): ICD-10-CM

## 2023-05-18 DIAGNOSIS — E11.40 CONTROLLED TYPE 2 DIABETES MELLITUS WITH DIABETIC NEUROPATHY, WITHOUT LONG-TERM CURRENT USE OF INSULIN (HCC): ICD-10-CM

## 2023-05-18 DIAGNOSIS — E78.00 HYPERCHOLESTEROLEMIA: ICD-10-CM

## 2023-05-18 DIAGNOSIS — I44.2 COMPLETE HEART BLOCK (HCC): ICD-10-CM

## 2023-05-18 PROBLEM — I20.0 ACCELERATING ANGINA (HCC): Status: ACTIVE | Noted: 2023-05-18

## 2023-05-18 LAB
ANION GAP SERPL CALC-SCNC: 4 MMOL/L (ref 2–11)
BUN SERPL-MCNC: 19 MG/DL (ref 8–23)
CALCIUM SERPL-MCNC: 8.6 MG/DL (ref 8.3–10.4)
CHLORIDE SERPL-SCNC: 108 MMOL/L (ref 101–110)
CO2 SERPL-SCNC: 27 MMOL/L (ref 21–32)
CREAT SERPL-MCNC: 1.3 MG/DL (ref 0.8–1.5)
ERYTHROCYTE [DISTWIDTH] IN BLOOD BY AUTOMATED COUNT: 15.3 % (ref 11.9–14.6)
GLUCOSE SERPL-MCNC: 92 MG/DL (ref 65–100)
HCT VFR BLD AUTO: 40.4 % (ref 41.1–50.3)
HGB BLD-MCNC: 12.7 G/DL (ref 13.6–17.2)
MCH RBC QN AUTO: 27.4 PG (ref 26.1–32.9)
MCHC RBC AUTO-ENTMCNC: 31.4 G/DL (ref 31.4–35)
MCV RBC AUTO: 87.1 FL (ref 82–102)
NRBC # BLD: 0 K/UL (ref 0–0.2)
PLATELET # BLD AUTO: 234 K/UL (ref 150–450)
PMV BLD AUTO: 10.6 FL (ref 9.4–12.3)
POTASSIUM SERPL-SCNC: 3.8 MMOL/L (ref 3.5–5.1)
RBC # BLD AUTO: 4.64 M/UL (ref 4.23–5.6)
SODIUM SERPL-SCNC: 139 MMOL/L (ref 133–143)
WBC # BLD AUTO: 4.7 K/UL (ref 4.3–11.1)

## 2023-05-18 PROCEDURE — 1123F ACP DISCUSS/DSCN MKR DOCD: CPT | Performed by: INTERNAL MEDICINE

## 2023-05-18 PROCEDURE — 3044F HG A1C LEVEL LT 7.0%: CPT | Performed by: INTERNAL MEDICINE

## 2023-05-18 PROCEDURE — G8417 CALC BMI ABV UP PARAM F/U: HCPCS | Performed by: INTERNAL MEDICINE

## 2023-05-18 PROCEDURE — G8427 DOCREV CUR MEDS BY ELIG CLIN: HCPCS | Performed by: INTERNAL MEDICINE

## 2023-05-18 PROCEDURE — 99214 OFFICE O/P EST MOD 30 MIN: CPT | Performed by: INTERNAL MEDICINE

## 2023-05-18 PROCEDURE — 1036F TOBACCO NON-USER: CPT | Performed by: INTERNAL MEDICINE

## 2023-05-18 PROCEDURE — 3078F DIAST BP <80 MM HG: CPT | Performed by: INTERNAL MEDICINE

## 2023-05-18 PROCEDURE — 3075F SYST BP GE 130 - 139MM HG: CPT | Performed by: INTERNAL MEDICINE

## 2023-05-19 DIAGNOSIS — I44.2 COMPLETE HEART BLOCK (HCC): Primary | ICD-10-CM

## 2023-05-19 DIAGNOSIS — Z95.0 PACEMAKER: ICD-10-CM

## 2023-06-05 ENCOUNTER — HOSPITAL ENCOUNTER (OUTPATIENT)
Age: 83
Setting detail: OUTPATIENT SURGERY
Discharge: HOME | End: 2023-06-05
Attending: INTERNAL MEDICINE | Admitting: INTERNAL MEDICINE
Payer: MEDICARE

## 2023-06-05 VITALS
DIASTOLIC BLOOD PRESSURE: 75 MMHG | WEIGHT: 217 LBS | HEIGHT: 71 IN | SYSTOLIC BLOOD PRESSURE: 131 MMHG | BODY MASS INDEX: 30.38 KG/M2 | RESPIRATION RATE: 16 BRPM | OXYGEN SATURATION: 98 % | HEART RATE: 60 BPM

## 2023-06-05 DIAGNOSIS — I20.0 ACCELERATING ANGINA (HCC): ICD-10-CM

## 2023-06-05 PROBLEM — Z95.0 PACEMAKER: Status: ACTIVE | Noted: 2023-06-05

## 2023-06-05 PROBLEM — R93.1 ABNORMAL NUCLEAR CARDIAC IMAGING TEST: Status: ACTIVE | Noted: 2023-06-05

## 2023-06-05 PROBLEM — R06.02 SOB (SHORTNESS OF BREATH): Status: ACTIVE | Noted: 2023-06-05

## 2023-06-05 LAB
ANION GAP SERPL CALC-SCNC: 4 MMOL/L (ref 2–11)
BUN SERPL-MCNC: 19 MG/DL (ref 8–23)
CALCIUM SERPL-MCNC: 8.9 MG/DL (ref 8.3–10.4)
CHLORIDE SERPL-SCNC: 109 MMOL/L (ref 101–110)
CO2 SERPL-SCNC: 27 MMOL/L (ref 21–32)
CREAT SERPL-MCNC: 1.2 MG/DL (ref 0.8–1.5)
ECHO BSA: 2.22 M2
EKG ATRIAL RATE: 69 BPM
EKG DIAGNOSIS: NORMAL
EKG P AXIS: 67 DEGREES
EKG P-R INTERVAL: 178 MS
EKG Q-T INTERVAL: 498 MS
EKG QRS DURATION: 194 MS
EKG QTC CALCULATION (BAZETT): 533 MS
EKG R AXIS: -72 DEGREES
EKG T AXIS: 87 DEGREES
EKG VENTRICULAR RATE: 69 BPM
ERYTHROCYTE [DISTWIDTH] IN BLOOD BY AUTOMATED COUNT: 14.9 % (ref 11.9–14.6)
GLUCOSE SERPL-MCNC: 151 MG/DL (ref 65–100)
HCT VFR BLD AUTO: 41.2 % (ref 41.1–50.3)
HGB BLD-MCNC: 13.2 G/DL (ref 13.6–17.2)
MAGNESIUM SERPL-MCNC: 2 MG/DL (ref 1.8–2.4)
MCH RBC QN AUTO: 28 PG (ref 26.1–32.9)
MCHC RBC AUTO-ENTMCNC: 32 G/DL (ref 31.4–35)
MCV RBC AUTO: 87.5 FL (ref 82–102)
NRBC # BLD: 0 K/UL (ref 0–0.2)
PLATELET # BLD AUTO: 190 K/UL (ref 150–450)
PMV BLD AUTO: 9.9 FL (ref 9.4–12.3)
POTASSIUM SERPL-SCNC: 3.7 MMOL/L (ref 3.5–5.1)
RBC # BLD AUTO: 4.71 M/UL (ref 4.23–5.6)
SODIUM SERPL-SCNC: 140 MMOL/L (ref 133–143)
WBC # BLD AUTO: 5 K/UL (ref 4.3–11.1)

## 2023-06-05 PROCEDURE — C1894 INTRO/SHEATH, NON-LASER: HCPCS | Performed by: INTERNAL MEDICINE

## 2023-06-05 PROCEDURE — 6360000004 HC RX CONTRAST MEDICATION: Performed by: INTERNAL MEDICINE

## 2023-06-05 PROCEDURE — 80048 BASIC METABOLIC PNL TOTAL CA: CPT

## 2023-06-05 PROCEDURE — 99152 MOD SED SAME PHYS/QHP 5/>YRS: CPT | Performed by: INTERNAL MEDICINE

## 2023-06-05 PROCEDURE — 6360000002 HC RX W HCPCS: Performed by: INTERNAL MEDICINE

## 2023-06-05 PROCEDURE — 2709999900 HC NON-CHARGEABLE SUPPLY: Performed by: INTERNAL MEDICINE

## 2023-06-05 PROCEDURE — 93458 L HRT ARTERY/VENTRICLE ANGIO: CPT | Performed by: INTERNAL MEDICINE

## 2023-06-05 PROCEDURE — 93010 ELECTROCARDIOGRAM REPORT: CPT | Performed by: INTERNAL MEDICINE

## 2023-06-05 PROCEDURE — 93452 LEFT HRT CATH W/VENTRCLGRPHY: CPT | Performed by: INTERNAL MEDICINE

## 2023-06-05 PROCEDURE — 2500000003 HC RX 250 WO HCPCS: Performed by: INTERNAL MEDICINE

## 2023-06-05 PROCEDURE — C1769 GUIDE WIRE: HCPCS | Performed by: INTERNAL MEDICINE

## 2023-06-05 PROCEDURE — 83735 ASSAY OF MAGNESIUM: CPT

## 2023-06-05 PROCEDURE — 93005 ELECTROCARDIOGRAM TRACING: CPT | Performed by: INTERNAL MEDICINE

## 2023-06-05 PROCEDURE — 85027 COMPLETE CBC AUTOMATED: CPT

## 2023-06-05 RX ORDER — ASPIRIN 81 MG/1
324 TABLET, CHEWABLE ORAL ONCE
Status: DISCONTINUED | OUTPATIENT
Start: 2023-06-05 | End: 2023-06-05 | Stop reason: HOSPADM

## 2023-06-05 RX ORDER — SODIUM CHLORIDE 9 MG/ML
INJECTION, SOLUTION INTRAVENOUS CONTINUOUS
Status: DISCONTINUED | OUTPATIENT
Start: 2023-06-05 | End: 2023-06-05 | Stop reason: HOSPADM

## 2023-06-05 RX ORDER — ACETAMINOPHEN 325 MG/1
650 TABLET ORAL EVERY 4 HOURS PRN
OUTPATIENT
Start: 2023-06-05

## 2023-06-05 RX ORDER — SODIUM CHLORIDE 9 MG/ML
INJECTION, SOLUTION INTRAVENOUS CONTINUOUS
OUTPATIENT
Start: 2023-06-05 | End: 2023-06-05

## 2023-06-05 RX ORDER — SODIUM CHLORIDE 9 MG/ML
INJECTION, SOLUTION INTRAVENOUS CONTINUOUS
OUTPATIENT
Start: 2023-06-05

## 2023-06-05 RX ORDER — MIDAZOLAM HYDROCHLORIDE 1 MG/ML
INJECTION INTRAMUSCULAR; INTRAVENOUS PRN
Status: DISCONTINUED | OUTPATIENT
Start: 2023-06-05 | End: 2023-06-05 | Stop reason: HOSPADM

## 2023-06-05 RX ORDER — SODIUM CHLORIDE 0.9 % (FLUSH) 0.9 %
5-40 SYRINGE (ML) INJECTION PRN
OUTPATIENT
Start: 2023-06-05

## 2023-06-05 RX ORDER — SODIUM CHLORIDE 0.9 % (FLUSH) 0.9 %
5-40 SYRINGE (ML) INJECTION EVERY 12 HOURS SCHEDULED
OUTPATIENT
Start: 2023-06-05

## 2023-06-05 RX ORDER — HEPARIN SODIUM 200 [USP'U]/100ML
INJECTION, SOLUTION INTRAVENOUS CONTINUOUS PRN
Status: COMPLETED | OUTPATIENT
Start: 2023-06-05 | End: 2023-06-05

## 2023-06-05 RX ORDER — LIDOCAINE HYDROCHLORIDE 10 MG/ML
INJECTION, SOLUTION INFILTRATION; PERINEURAL PRN
Status: DISCONTINUED | OUTPATIENT
Start: 2023-06-05 | End: 2023-06-05 | Stop reason: HOSPADM

## 2023-06-05 NOTE — PROGRESS NOTES
Patient received to [address] [place] [address] # 11  Ambulatory from [facility]. Patient scheduled for [facility] today with Dr Job Chirinos. Procedure reviewed & questions answered, voiced good understanding consent obtained & placed on chart. All medications and medical history reviewed. Will prep patient per orders. Patient & family updated on plan of care. The patient has a fraility score of 3-MANAGING WELL, based on patient A&Ox3, patient able to ambulate to room without difficulty.     Patient took aspirin 324mg at 0630 prior to arrival.

## 2023-06-05 NOTE — PROGRESS NOTES
R band deflation completed. Right radial dressed with tegaderm using sterile technique. No bleeding or hematoma. Tolerated without difficulty. Discharge instructions given per orders, voiced good understanding of meds & follow up care. Denies any questions.

## 2023-06-05 NOTE — PROGRESS NOTES
Report received from [facility], Cath Lab RN. Procedural finding communicated. Intra procedural medication administration reviewed. Progression of care discussed. Patient received into CPRU room 6, Post cardiac catheterization. Access site without bleeding or swelling. Yes    Patient instructed to limit movement of right upper extremity. Routine post procedural vital signs & site assessment initiated.

## 2023-06-05 NOTE — DISCHARGE INSTRUCTIONS
safety, you should not drive or operate heavy machinery for the remainder of the day     Rest when you feel tired. Getting enough sleep will help you recover. Diet    You can eat your normal diet, unless your doctor gives you other instructions. If your stomach is upset, try clear liquids and bland, low-fat foods like plain toast or rice. Drink plenty of fluids (unless your doctor tells you not to). Don't drink alcohol for 24 hours. Medicines    Be safe with medicines. Read and follow all instructions on the label. If the doctor gave you a prescription medicine for pain, take it as prescribed. If you are not taking a prescription pain medicine, ask your doctor if you can take an over-the-counter medicine. If you think your pain medicine is making you sick to your stomach: Take your medicine after meals (unless your doctor has told you not to). Ask your doctor for a different pain medicine. I have read the above instructions and have had the opportunity to ask questions.       Patient: ________________________   Date: _____________    Witness: _______________________   Date: _____________

## 2023-06-05 NOTE — PROGRESS NOTES
LHC via rt radial with Dr Suazo Fraction. No intervention. Heparin 5000u RC  Fentanyl 25mcg  Versed 2mg  TR @ 10    Report to receiving RN. Pt transferred [address].

## 2023-06-05 NOTE — PROGRESS NOTES
Family at bedside. Patient provided PO fluids, urinal. Call bell within reach, no needs expressed at this time.

## 2023-06-06 ENCOUNTER — TELEPHONE (OUTPATIENT)
Age: 83
End: 2023-06-06

## 2023-06-06 NOTE — TELEPHONE ENCOUNTER
Spoke to pt and wife. Instructed pt not to take evening dose of metaglip today and to hold tomorrow. Push fluids today to 64 oz minimum to keep kidneys flushed out. Reviewed site care, lifting limitations with pt and wife. Verb understanding.

## 2023-06-06 NOTE — TELEPHONE ENCOUNTER
Pt's wife states the pt had a heart cath yesterday and was told to hold his Metaglip for 48 hours. However, pt forgot and took his Metaglip this morning. Pt's wife would like to know what they need to watch out for.

## 2023-06-17 ASSESSMENT — ENCOUNTER SYMPTOMS
PHOTOPHOBIA: 0
ABDOMINAL PAIN: 0
ABDOMINAL DISTENTION: 0
COUGH: 0
SORE THROAT: 0
DIARRHEA: 0
SHORTNESS OF BREATH: 0
CONSTIPATION: 0

## 2023-06-19 ENCOUNTER — OFFICE VISIT (OUTPATIENT)
Age: 83
End: 2023-06-19
Payer: MEDICARE

## 2023-06-19 VITALS
SYSTOLIC BLOOD PRESSURE: 130 MMHG | BODY MASS INDEX: 29.68 KG/M2 | HEIGHT: 71 IN | HEART RATE: 78 BPM | DIASTOLIC BLOOD PRESSURE: 78 MMHG | WEIGHT: 212 LBS

## 2023-06-19 DIAGNOSIS — I10 ESSENTIAL HYPERTENSION: Primary | ICD-10-CM

## 2023-06-19 DIAGNOSIS — E11.40 CONTROLLED TYPE 2 DIABETES MELLITUS WITH DIABETIC NEUROPATHY, WITHOUT LONG-TERM CURRENT USE OF INSULIN (HCC): ICD-10-CM

## 2023-06-19 DIAGNOSIS — I47.29 NSVT (NONSUSTAINED VENTRICULAR TACHYCARDIA) (HCC): ICD-10-CM

## 2023-06-19 DIAGNOSIS — E03.9 ACQUIRED HYPOTHYROIDISM: ICD-10-CM

## 2023-06-19 DIAGNOSIS — E78.00 HYPERCHOLESTEROLEMIA: ICD-10-CM

## 2023-06-19 DIAGNOSIS — I44.2 COMPLETE HEART BLOCK (HCC): ICD-10-CM

## 2023-06-19 DIAGNOSIS — Z95.0 PACEMAKER: ICD-10-CM

## 2023-06-19 PROCEDURE — 1123F ACP DISCUSS/DSCN MKR DOCD: CPT | Performed by: INTERNAL MEDICINE

## 2023-06-19 PROCEDURE — 1036F TOBACCO NON-USER: CPT | Performed by: INTERNAL MEDICINE

## 2023-06-19 PROCEDURE — 3075F SYST BP GE 130 - 139MM HG: CPT | Performed by: INTERNAL MEDICINE

## 2023-06-19 PROCEDURE — G8417 CALC BMI ABV UP PARAM F/U: HCPCS | Performed by: INTERNAL MEDICINE

## 2023-06-19 PROCEDURE — 3078F DIAST BP <80 MM HG: CPT | Performed by: INTERNAL MEDICINE

## 2023-06-19 PROCEDURE — 3044F HG A1C LEVEL LT 7.0%: CPT | Performed by: INTERNAL MEDICINE

## 2023-06-19 PROCEDURE — 99214 OFFICE O/P EST MOD 30 MIN: CPT | Performed by: INTERNAL MEDICINE

## 2023-06-19 PROCEDURE — G8427 DOCREV CUR MEDS BY ELIG CLIN: HCPCS | Performed by: INTERNAL MEDICINE

## 2023-06-19 RX ORDER — HYDROCHLOROTHIAZIDE 25 MG/1
12.5 TABLET ORAL DAILY
Qty: 180 TABLET | Refills: 3
Start: 2023-06-19

## 2023-06-19 RX ORDER — METOPROLOL SUCCINATE 25 MG/1
25 TABLET, EXTENDED RELEASE ORAL EVERY EVENING
Qty: 30 TABLET | Refills: 11 | Status: SHIPPED | OUTPATIENT
Start: 2023-06-19

## 2023-06-23 DIAGNOSIS — I44.2 COMPLETE HEART BLOCK (HCC): ICD-10-CM

## 2023-06-23 DIAGNOSIS — I44.2 COMPLETE HEART BLOCK (HCC): Primary | ICD-10-CM

## 2023-06-23 DIAGNOSIS — Z95.0 PACEMAKER: ICD-10-CM

## 2023-07-24 ENCOUNTER — TELEPHONE (OUTPATIENT)
Age: 83
End: 2023-07-24

## 2023-07-24 ENCOUNTER — NURSE ONLY (OUTPATIENT)
Dept: UROLOGY | Age: 83
End: 2023-07-24

## 2023-07-24 DIAGNOSIS — I25.5 ISCHEMIC CARDIOMYOPATHY: Primary | ICD-10-CM

## 2023-07-24 DIAGNOSIS — R97.20 ELEVATED PSA: ICD-10-CM

## 2023-07-24 DIAGNOSIS — I44.2 COMPLETE HEART BLOCK (HCC): ICD-10-CM

## 2023-07-24 LAB — PSA SERPL-MCNC: 2.8 NG/ML

## 2023-07-24 NOTE — TELEPHONE ENCOUNTER
Device transmission showing multiple episodes of VT. Hx of CHB. Recent cath done in June. BMP and Mag ordered. Full report available in [facility].

## 2023-07-26 DIAGNOSIS — I44.2 COMPLETE HEART BLOCK (HCC): ICD-10-CM

## 2023-07-26 LAB
ANION GAP SERPL CALC-SCNC: 7 MMOL/L (ref 2–11)
BUN SERPL-MCNC: 17 MG/DL (ref 8–23)
CALCIUM SERPL-MCNC: 8.8 MG/DL (ref 8.3–10.4)
CHLORIDE SERPL-SCNC: 108 MMOL/L (ref 101–110)
CO2 SERPL-SCNC: 28 MMOL/L (ref 21–32)
CREAT SERPL-MCNC: 1.3 MG/DL (ref 0.8–1.5)
GLUCOSE SERPL-MCNC: 140 MG/DL (ref 65–100)
MAGNESIUM SERPL-MCNC: 2 MG/DL (ref 1.8–2.4)
POTASSIUM SERPL-SCNC: 3.8 MMOL/L (ref 3.5–5.1)
SODIUM SERPL-SCNC: 143 MMOL/L (ref 133–143)

## 2023-07-31 ENCOUNTER — OFFICE VISIT (OUTPATIENT)
Dept: UROLOGY | Age: 83
End: 2023-07-31
Payer: MEDICARE

## 2023-07-31 DIAGNOSIS — N40.1 BPH WITH OBSTRUCTION/LOWER URINARY TRACT SYMPTOMS: Primary | ICD-10-CM

## 2023-07-31 DIAGNOSIS — N13.8 BPH WITH OBSTRUCTION/LOWER URINARY TRACT SYMPTOMS: Primary | ICD-10-CM

## 2023-07-31 PROCEDURE — 1036F TOBACCO NON-USER: CPT | Performed by: UROLOGY

## 2023-07-31 PROCEDURE — 99212 OFFICE O/P EST SF 10 MIN: CPT | Performed by: UROLOGY

## 2023-07-31 PROCEDURE — G8427 DOCREV CUR MEDS BY ELIG CLIN: HCPCS | Performed by: UROLOGY

## 2023-07-31 PROCEDURE — G8417 CALC BMI ABV UP PARAM F/U: HCPCS | Performed by: UROLOGY

## 2023-07-31 PROCEDURE — 1123F ACP DISCUSS/DSCN MKR DOCD: CPT | Performed by: UROLOGY

## 2023-07-31 ASSESSMENT — ENCOUNTER SYMPTOMS
BACK PAIN: 0
NAUSEA: 0

## 2023-07-31 NOTE — PROGRESS NOTES
mouth daily      levothyroxine (SYNTHROID) 200 MCG tablet Take 1 tablet by mouth daily 90 tablet 3    acetaminophen (TYLENOL) 500 MG tablet Take 1 tablet by mouth every 6 hours as needed for Pain      bimatoprost (LUMIGAN) 0.01 % SOLN ophthalmic drops Place 1 drop into both eyes nightly       No current facility-administered medications for this visit. No Known Allergies  Social History     Socioeconomic History    Marital status:      Spouse name: Not on file    Number of children: Not on file    Years of education: Not on file    Highest education level: Not on file   Occupational History    Not on file   Tobacco Use    Smoking status: Former     Packs/day: 0.50     Years: 15.00     Pack years: 7.50     Types: Cigarettes     Quit date: 1988     Years since quittin.1    Smokeless tobacco: Never   Vaping Use    Vaping Use: Never used   Substance and Sexual Activity    Alcohol use: No    Drug use: No    Sexual activity: Not on file   Other Topics Concern    Not on file   Social History Narrative    Not on file     Social Determinants of Health     Financial Resource Strain: Low Risk     Difficulty of Paying Living Expenses: Not hard at all   Food Insecurity: No Food Insecurity    Worried About [place] in the Last Year: Never true    [address] in the Last Year: Never true   Transportation Needs: Unknown    Lack of Transportation (Medical): Not on file    Lack of Transportation (Non-Medical):  No   Physical Activity: Not on file   Stress: Not on file   Social Connections: Not on file   Intimate Partner Violence: Not on file   Housing Stability: Unknown    Unable to Pay for Housing in the Last Year: Not on file    Number of Places Lived in the Last Year: Not on file    Unstable Housing in the Last Year: No     Family History   Problem Relation Age of Onset    Cancer Mother     Cancer Father     Hypertension Father     Cancer Sister     Hypertension Brother     Stroke Brother

## 2023-08-07 PROCEDURE — 93294 REM INTERROG EVL PM/LDLS PM: CPT | Performed by: INTERNAL MEDICINE

## 2023-08-07 PROCEDURE — 93296 REM INTERROG EVL PM/IDS: CPT | Performed by: INTERNAL MEDICINE

## 2023-08-18 ENCOUNTER — OFFICE VISIT (OUTPATIENT)
Dept: INTERNAL MEDICINE CLINIC | Facility: CLINIC | Age: 83
End: 2023-08-18
Payer: MEDICARE

## 2023-08-18 VITALS
HEIGHT: 71 IN | SYSTOLIC BLOOD PRESSURE: 138 MMHG | HEART RATE: 70 BPM | DIASTOLIC BLOOD PRESSURE: 80 MMHG | WEIGHT: 215 LBS | OXYGEN SATURATION: 97 % | BODY MASS INDEX: 30.1 KG/M2

## 2023-08-18 DIAGNOSIS — E03.9 HYPOTHYROIDISM, UNSPECIFIED TYPE: ICD-10-CM

## 2023-08-18 DIAGNOSIS — I10 ESSENTIAL (PRIMARY) HYPERTENSION: ICD-10-CM

## 2023-08-18 DIAGNOSIS — E11.40 CONTROLLED TYPE 2 DIABETES MELLITUS WITH DIABETIC NEUROPATHY, WITHOUT LONG-TERM CURRENT USE OF INSULIN (HCC): Primary | ICD-10-CM

## 2023-08-18 DIAGNOSIS — E11.40 CONTROLLED TYPE 2 DIABETES MELLITUS WITH DIABETIC NEUROPATHY, WITHOUT LONG-TERM CURRENT USE OF INSULIN (HCC): ICD-10-CM

## 2023-08-18 DIAGNOSIS — E78.00 HYPERCHOLESTEROLEMIA: ICD-10-CM

## 2023-08-18 LAB
ANION GAP SERPL CALC-SCNC: 5 MMOL/L (ref 2–11)
BUN SERPL-MCNC: 20 MG/DL (ref 8–23)
CALCIUM SERPL-MCNC: 8.9 MG/DL (ref 8.3–10.4)
CHLORIDE SERPL-SCNC: 108 MMOL/L (ref 101–110)
CO2 SERPL-SCNC: 26 MMOL/L (ref 21–32)
CREAT SERPL-MCNC: 1.3 MG/DL (ref 0.8–1.5)
GLUCOSE SERPL-MCNC: 125 MG/DL (ref 65–100)
POTASSIUM SERPL-SCNC: 3.5 MMOL/L (ref 3.5–5.1)
SODIUM SERPL-SCNC: 139 MMOL/L (ref 133–143)
TSH W FREE THYROID IF ABNORMAL: 0.98 UIU/ML (ref 0.36–3.74)

## 2023-08-18 PROCEDURE — 1123F ACP DISCUSS/DSCN MKR DOCD: CPT | Performed by: INTERNAL MEDICINE

## 2023-08-18 PROCEDURE — 1036F TOBACCO NON-USER: CPT | Performed by: INTERNAL MEDICINE

## 2023-08-18 PROCEDURE — 99214 OFFICE O/P EST MOD 30 MIN: CPT | Performed by: INTERNAL MEDICINE

## 2023-08-18 PROCEDURE — G8417 CALC BMI ABV UP PARAM F/U: HCPCS | Performed by: INTERNAL MEDICINE

## 2023-08-18 PROCEDURE — 3075F SYST BP GE 130 - 139MM HG: CPT | Performed by: INTERNAL MEDICINE

## 2023-08-18 PROCEDURE — G8427 DOCREV CUR MEDS BY ELIG CLIN: HCPCS | Performed by: INTERNAL MEDICINE

## 2023-08-18 PROCEDURE — 3078F DIAST BP <80 MM HG: CPT | Performed by: INTERNAL MEDICINE

## 2023-08-18 PROCEDURE — 3044F HG A1C LEVEL LT 7.0%: CPT | Performed by: INTERNAL MEDICINE

## 2023-08-18 ASSESSMENT — PATIENT HEALTH QUESTIONNAIRE - PHQ9
2. FEELING DOWN, DEPRESSED OR HOPELESS: 0
SUM OF ALL RESPONSES TO PHQ QUESTIONS 1-9: 0
SUM OF ALL RESPONSES TO PHQ9 QUESTIONS 1 & 2: 0
1. LITTLE INTEREST OR PLEASURE IN DOING THINGS: 0
SUM OF ALL RESPONSES TO PHQ QUESTIONS 1-9: 0

## 2023-08-18 ASSESSMENT — ENCOUNTER SYMPTOMS
COUGH: 0
SHORTNESS OF BREATH: 0

## 2023-08-18 NOTE — ACP (ADVANCE CARE PLANNING)
Advance Care Planning   Healthcare Decision Maker:    Primary Decision Maker: Cata Tinoco - [phone]    Click here to complete Healthcare Decision Makers including selection of the Healthcare Decision Maker Relationship (ie \"Primary\"). Today we documented Decision Maker(s) consistent with Legal Next of Kin hierarchy.        If the relationship to the patient does NOT follow our state's Next of Kin hierarchy, the patient MUST complete an ACP Document to allow him/her to act on the patient's behalf. :  .

## 2023-08-18 NOTE — PROGRESS NOTES
SUBJECTIVE:   Kunal Saavedra is a 81 y.o. male seen for a visit regarding   Chief Complaint   Patient presents with    Diabetes     Pt is here for routine 4 month check up. Diabetes  He presents for his follow-up diabetic visit. He has type 2 diabetes mellitus. His disease course has been stable. Pertinent negatives for diabetes include no chest pain. (Once -treated) Symptoms are stable. Current diabetic treatments: glip/metformin. Home blood sugar record trend: checks qod -runs 140's -can be 170 based on supper night before. (A1C 6.8 in April; had been 7.7 to 8.0)   Hypertension  This is a chronic problem. The current episode started more than 1 year ago. The problem is unchanged. The problem is controlled. Pertinent negatives include no chest pain, palpitations or shortness of breath. Risk factors: LDL 50 and HDL 63 in April on statin. Identifiable causes of hypertension include a thyroid problem. Thyroid Problem  Presents for follow-up visit. Patient reports no cold intolerance, diaphoresis or palpitations. (Had TSH 10 in April-dose of 25 qd added to 200 mcg qd)     Past Medical History, Past Surgical History, Family history, Social History, and Medications were all reviewed with the patient today and updated as necessary.        Current Outpatient Medications   Medication Sig Dispense Refill    metoprolol succinate (TOPROL XL) 50 MG extended release tablet Take 1 tablet by mouth daily 30 tablet 3    hydroCHLOROthiazide (HYDRODIURIL) 25 MG tablet Take 0.5 tablets by mouth daily (Patient taking differently: Take 0.5 tablets by mouth daily 2 tablets daily) 180 tablet 3    amLODIPine (NORVASC) 5 MG tablet Take 1 tablet by mouth daily 90 tablet 4    glipiZIDE-metFORMIN (METAGLIP) 5-500 MG per tablet Take 2 tabs qam and 1.5 tabs qhs. (Patient taking differently: Take 1 tablet by mouth 2 times daily (before meals) Take 2 tabs qam and 1 tab qhs.) 315 tablet 3    losartan (COZAAR) 100 MG tablet TAKE 1 TABLET EVERY

## 2023-08-19 LAB
EST. AVERAGE GLUCOSE BLD GHB EST-MCNC: 154 MG/DL
HBA1C MFR BLD: 7 % (ref 4.8–5.6)

## 2023-11-02 ENCOUNTER — OFFICE VISIT (OUTPATIENT)
Dept: INTERNAL MEDICINE CLINIC | Facility: CLINIC | Age: 83
End: 2023-11-02
Payer: MEDICARE

## 2023-11-02 VITALS
OXYGEN SATURATION: 99 % | BODY MASS INDEX: 30.24 KG/M2 | WEIGHT: 216 LBS | DIASTOLIC BLOOD PRESSURE: 70 MMHG | HEIGHT: 71 IN | SYSTOLIC BLOOD PRESSURE: 122 MMHG | HEART RATE: 76 BPM

## 2023-11-02 DIAGNOSIS — N18.31 STAGE 3A CHRONIC KIDNEY DISEASE (HCC): ICD-10-CM
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
## 2023-11-02 DIAGNOSIS — T14.8XXA MUSCLE CONTUSION: Primary | ICD-10-CM

## 2023-11-02 DIAGNOSIS — E03.9 ACQUIRED HYPOTHYROIDISM: ICD-10-CM

## 2023-11-02 PROBLEM — N18.30 CHRONIC RENAL DISEASE, STAGE III (HCC): Status: ACTIVE | Noted: 2023-11-02

## 2023-11-02 PROCEDURE — G8417 CALC BMI ABV UP PARAM F/U: HCPCS | Performed by: STUDENT IN AN ORGANIZED HEALTH CARE EDUCATION/TRAINING PROGRAM

## 2023-11-02 PROCEDURE — 1123F ACP DISCUSS/DSCN MKR DOCD: CPT | Performed by: STUDENT IN AN ORGANIZED HEALTH CARE EDUCATION/TRAINING PROGRAM

## 2023-11-02 PROCEDURE — 1036F TOBACCO NON-USER: CPT | Performed by: STUDENT IN AN ORGANIZED HEALTH CARE EDUCATION/TRAINING PROGRAM

## 2023-11-02 PROCEDURE — 3078F DIAST BP <80 MM HG: CPT | Performed by: STUDENT IN AN ORGANIZED HEALTH CARE EDUCATION/TRAINING PROGRAM

## 2023-11-02 PROCEDURE — 99213 OFFICE O/P EST LOW 20 MIN: CPT | Performed by: STUDENT IN AN ORGANIZED HEALTH CARE EDUCATION/TRAINING PROGRAM

## 2023-11-02 PROCEDURE — G8484 FLU IMMUNIZE NO ADMIN: HCPCS | Performed by: STUDENT IN AN ORGANIZED HEALTH CARE EDUCATION/TRAINING PROGRAM

## 2023-11-02 PROCEDURE — G8427 DOCREV CUR MEDS BY ELIG CLIN: HCPCS | Performed by: STUDENT IN AN ORGANIZED HEALTH CARE EDUCATION/TRAINING PROGRAM

## 2023-11-02 PROCEDURE — 3074F SYST BP LT 130 MM HG: CPT | Performed by: STUDENT IN AN ORGANIZED HEALTH CARE EDUCATION/TRAINING PROGRAM

## 2023-11-02 RX ORDER — TIZANIDINE 2 MG/1
2 TABLET ORAL 2 TIMES DAILY PRN
Qty: 30 TABLET | Refills: 0 | Status: SHIPPED | OUTPATIENT
Start: 2023-11-02

## 2023-11-02 RX ORDER — LEVOTHYROXINE SODIUM 0.03 MG/1
25 TABLET ORAL DAILY
Qty: 90 TABLET | Refills: 3 | Status: SHIPPED | OUTPATIENT
Start: 2023-11-02

## 2023-11-02 ASSESSMENT — PATIENT HEALTH QUESTIONNAIRE - PHQ9
SUM OF ALL RESPONSES TO PHQ QUESTIONS 1-9: 0
1. LITTLE INTEREST OR PLEASURE IN DOING THINGS: 0
SUM OF ALL RESPONSES TO PHQ9 QUESTIONS 1 & 2: 0
2. FEELING DOWN, DEPRESSED OR HOPELESS: 0
SUM OF ALL RESPONSES TO PHQ QUESTIONS 1-9: 0

## 2023-11-02 NOTE — PROGRESS NOTES
FOLLOW UP VISIT    Subjective:    Tank Hernandez (: 1940) is a 81 y.o., male,   Chief Complaint   Patient presents with    Back Pain     Back pain for about a week. Tylenol is helping    Fall     Fall at home about 3 weeks ago       HPI:    R sided back pain for 1 week, went to urgent care and had xray which looked ok. Currently pain is not bad, taking tylenol which helps. No numbness, weakness. 3 weeks ago was stepping off ladder, fell on R hip, it felt ok after the fall. No other injury.     The following portions of the patient's history were reviewed and updated as appropriate:      Patient Active Problem List    Diagnosis Date Noted    Complete heart block ([address]) 2022    BPH with obstruction/lower urinary tract symptoms 2022    Chronic renal disease, stage III [facility]) [840313] 2023    Fall 2023    Abnormal nuclear cardiac imaging test 2023    Pacemaker 2023    SOB (shortness of breath) 2023    Accelerating angina ([address]) 2023    NSVT (nonsustained ventricular tachycardia) ([address]) 2023    Sebaceous cyst 2023    Hematuria 2018    Hypercholesterolemia 2018    Constipation 2017    Elevated PSA 2017    Acquired hypothyroidism 2017    Controlled type 2 diabetes mellitus with diabetic neuropathy, without long-term current use of insulin ([address]) 2017    Essential hypertension 2017    Osteoarthritis of multiple joints 2015    S/P total knee replacement 2015    Benign prostatic hyperplasia with lower urinary tract symptoms 06/10/2014    Urinary retention 06/10/2014      Past Medical History:   Diagnosis Date    Chronic pain     arthritic    Chronic renal disease, stage III ([address]) [[id number]] 2023    Complete heart block ([address]) 2022    Enlarged prostate     \"improved since TURP\"    Enlarged prostate with lower urinary tract symptoms (LUTS)     Former cigarette smoker     Hypercholesteremia

## 2023-11-21 RX ORDER — TIZANIDINE 2 MG/1
2 TABLET ORAL 2 TIMES DAILY PRN
Qty: 30 TABLET | Refills: 0 | Status: SHIPPED | OUTPATIENT
Start: 2023-11-21
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
## 2024-01-08 ASSESSMENT — ENCOUNTER SYMPTOMS
COUGH: 0
ABDOMINAL DISTENTION: 0
SORE THROAT: 0
ABDOMINAL PAIN: 0
SHORTNESS OF BREATH: 0
PHOTOPHOBIA: 0
DIARRHEA: 0
CONSTIPATION: 0

## 2024-01-09 ENCOUNTER — OFFICE VISIT (OUTPATIENT)
Age: 84
End: 2024-01-09
Payer: MEDICARE

## 2024-01-09 ENCOUNTER — OFFICE VISIT (OUTPATIENT)
Dept: INTERNAL MEDICINE CLINIC | Facility: CLINIC | Age: 84
End: 2024-01-09
Payer: MEDICARE

## 2024-01-09 VITALS
WEIGHT: 221 LBS | BODY MASS INDEX: 30.94 KG/M2 | HEART RATE: 77 BPM | HEIGHT: 71 IN | DIASTOLIC BLOOD PRESSURE: 72 MMHG | OXYGEN SATURATION: 98 % | SYSTOLIC BLOOD PRESSURE: 136 MMHG

## 2024-01-09 VITALS
BODY MASS INDEX: 31.08 KG/M2 | WEIGHT: 222 LBS | HEIGHT: 71 IN | HEART RATE: 74 BPM | SYSTOLIC BLOOD PRESSURE: 134 MMHG | DIASTOLIC BLOOD PRESSURE: 66 MMHG

## 2024-01-09 DIAGNOSIS — I20.0 ACCELERATING ANGINA (HCC): ICD-10-CM

## 2024-01-09 DIAGNOSIS — N40.1 BENIGN PROSTATIC HYPERPLASIA WITH LOWER URINARY TRACT SYMPTOMS, SYMPTOM DETAILS UNSPECIFIED: ICD-10-CM

## 2024-01-09 DIAGNOSIS — I10 ESSENTIAL HYPERTENSION: ICD-10-CM

## 2024-01-09 DIAGNOSIS — N18.31 STAGE 3A CHRONIC KIDNEY DISEASE (HCC): ICD-10-CM

## 2024-01-09 DIAGNOSIS — R25.2 LEG CRAMP: ICD-10-CM

## 2024-01-09 DIAGNOSIS — E03.9 ACQUIRED HYPOTHYROIDISM: ICD-10-CM

## 2024-01-09 DIAGNOSIS — I44.2 COMPLETE HEART BLOCK (HCC): ICD-10-CM

## 2024-01-09 DIAGNOSIS — Z95.0 PACEMAKER: ICD-10-CM

## 2024-01-09 DIAGNOSIS — E78.00 HYPERCHOLESTEROLEMIA: ICD-10-CM

## 2024-01-09 DIAGNOSIS — I10 ESSENTIAL HYPERTENSION: Primary | ICD-10-CM

## 2024-01-09 DIAGNOSIS — E11.40 CONTROLLED TYPE 2 DIABETES MELLITUS WITH DIABETIC NEUROPATHY, WITHOUT LONG-TERM CURRENT USE OF INSULIN (HCC): ICD-10-CM

## 2024-01-09 DIAGNOSIS — I47.29 NSVT (NONSUSTAINED VENTRICULAR TACHYCARDIA) (HCC): ICD-10-CM

## 2024-01-09 DIAGNOSIS — E11.40 CONTROLLED TYPE 2 DIABETES MELLITUS WITH DIABETIC NEUROPATHY, WITHOUT LONG-TERM CURRENT USE OF INSULIN (HCC): Primary | ICD-10-CM

## 2024-01-09 LAB
ALBUMIN SERPL-MCNC: 3.7 G/DL (ref 3.2–4.6)
ALBUMIN/GLOB SERPL: 1.1 (ref 0.4–1.6)
ALP SERPL-CCNC: 86 U/L (ref 50–136)
ALT SERPL-CCNC: 36 U/L (ref 12–65)
ANION GAP SERPL CALC-SCNC: 8 MMOL/L (ref 2–11)
APPEARANCE UR: CLEAR
AST SERPL-CCNC: 25 U/L (ref 15–37)
BACTERIA URNS QL MICRO: NEGATIVE /HPF
BASOPHILS # BLD: 0.1 K/UL (ref 0–0.2)
BASOPHILS NFR BLD: 1 % (ref 0–2)
BILIRUB SERPL-MCNC: 0.9 MG/DL (ref 0.2–1.1)
BILIRUB UR QL: NEGATIVE
BUN SERPL-MCNC: 19 MG/DL (ref 8–23)
CALCIUM SERPL-MCNC: 9 MG/DL (ref 8.3–10.4)
CASTS URNS QL MICRO: ABNORMAL /LPF (ref 0–2)
CHLORIDE SERPL-SCNC: 110 MMOL/L (ref 103–113)
CHOLEST SERPL-MCNC: 126 MG/DL
CO2 SERPL-SCNC: 24 MMOL/L (ref 21–32)
COLOR UR: ABNORMAL
CREAT SERPL-MCNC: 1.3 MG/DL (ref 0.8–1.5)
CREAT UR-MCNC: 181 MG/DL
DIFFERENTIAL METHOD BLD: ABNORMAL
EOSINOPHIL # BLD: 0.1 K/UL (ref 0–0.8)
EOSINOPHIL NFR BLD: 3 % (ref 0.5–7.8)
EPI CELLS #/AREA URNS HPF: ABNORMAL /HPF (ref 0–5)
ERYTHROCYTE [DISTWIDTH] IN BLOOD BY AUTOMATED COUNT: 13.7 % (ref 11.9–14.6)
GLOBULIN SER CALC-MCNC: 3.4 G/DL (ref 2.8–4.5)
GLUCOSE SERPL-MCNC: 90 MG/DL (ref 65–100)
GLUCOSE UR STRIP.AUTO-MCNC: NEGATIVE MG/DL
HCT VFR BLD AUTO: 41.7 % (ref 41.1–50.3)
HDLC SERPL-MCNC: 44 MG/DL (ref 40–60)
HDLC SERPL: 2.9
HGB BLD-MCNC: 13.5 G/DL (ref 13.6–17.2)
HGB UR QL STRIP: NEGATIVE
IMM GRANULOCYTES # BLD AUTO: 0 K/UL (ref 0–0.5)
IMM GRANULOCYTES NFR BLD AUTO: 0 % (ref 0–5)
KETONES UR QL STRIP.AUTO: ABNORMAL MG/DL
LDLC SERPL CALC-MCNC: 49.2 MG/DL
LEUKOCYTE ESTERASE UR QL STRIP.AUTO: ABNORMAL
LYMPHOCYTES # BLD: 1.8 K/UL (ref 0.5–4.6)
LYMPHOCYTES NFR BLD: 44 % (ref 13–44)
MAGNESIUM SERPL-MCNC: 2 MG/DL (ref 1.8–2.4)
MCH RBC QN AUTO: 29 PG (ref 26.1–32.9)
MCHC RBC AUTO-ENTMCNC: 32.4 G/DL (ref 31.4–35)
MCV RBC AUTO: 89.7 FL (ref 82–102)
MICROALBUMIN UR-MCNC: 9.91 MG/DL
MICROALBUMIN/CREAT UR-RTO: 55 MG/G (ref 0–30)
MONOCYTES # BLD: 0.5 K/UL (ref 0.1–1.3)
MONOCYTES NFR BLD: 12 % (ref 4–12)
NEUTS SEG # BLD: 1.7 K/UL (ref 1.7–8.2)
NEUTS SEG NFR BLD: 40 % (ref 43–78)
NITRITE UR QL STRIP.AUTO: NEGATIVE
NRBC # BLD: 0 K/UL (ref 0–0.2)
PH UR STRIP: 6 (ref 5–9)
PLATELET # BLD AUTO: 203 K/UL (ref 150–450)
PMV BLD AUTO: 11.2 FL (ref 9.4–12.3)
POTASSIUM SERPL-SCNC: 3.7 MMOL/L (ref 3.5–5.1)
PROT SERPL-MCNC: 7.1 G/DL (ref 6.3–8.2)
PROT UR STRIP-MCNC: ABNORMAL MG/DL
RBC # BLD AUTO: 4.65 M/UL (ref 4.23–5.6)
RBC #/AREA URNS HPF: ABNORMAL /HPF (ref 0–5)
SODIUM SERPL-SCNC: 142 MMOL/L (ref 136–146)
SP GR UR REFRACTOMETRY: 1.02 (ref 1–1.02)
TRIGL SERPL-MCNC: 164 MG/DL (ref 35–150)
TSH W FREE THYROID IF ABNORMAL: 2.34 UIU/ML (ref 0.36–3.74)
UROBILINOGEN UR QL STRIP.AUTO: 1 EU/DL (ref 0.2–1)
VLDLC SERPL CALC-MCNC: 32.8 MG/DL (ref 6–23)
WBC # BLD AUTO: 4.2 K/UL (ref 4.3–11.1)
WBC URNS QL MICRO: ABNORMAL /HPF (ref 0–4)

## 2024-01-09 PROCEDURE — 1036F TOBACCO NON-USER: CPT | Performed by: STUDENT IN AN ORGANIZED HEALTH CARE EDUCATION/TRAINING PROGRAM

## 2024-01-09 PROCEDURE — 3075F SYST BP GE 130 - 139MM HG: CPT | Performed by: INTERNAL MEDICINE

## 2024-01-09 PROCEDURE — 99214 OFFICE O/P EST MOD 30 MIN: CPT | Performed by: STUDENT IN AN ORGANIZED HEALTH CARE EDUCATION/TRAINING PROGRAM

## 2024-01-09 PROCEDURE — 3078F DIAST BP <80 MM HG: CPT | Performed by: INTERNAL MEDICINE

## 2024-01-09 PROCEDURE — 99214 OFFICE O/P EST MOD 30 MIN: CPT | Performed by: INTERNAL MEDICINE

## 2024-01-09 PROCEDURE — 1123F ACP DISCUSS/DSCN MKR DOCD: CPT | Performed by: STUDENT IN AN ORGANIZED HEALTH CARE EDUCATION/TRAINING PROGRAM

## 2024-01-09 PROCEDURE — G8427 DOCREV CUR MEDS BY ELIG CLIN: HCPCS | Performed by: INTERNAL MEDICINE

## 2024-01-09 PROCEDURE — G8417 CALC BMI ABV UP PARAM F/U: HCPCS | Performed by: STUDENT IN AN ORGANIZED HEALTH CARE EDUCATION/TRAINING PROGRAM

## 2024-01-09 PROCEDURE — G8427 DOCREV CUR MEDS BY ELIG CLIN: HCPCS | Performed by: STUDENT IN AN ORGANIZED HEALTH CARE EDUCATION/TRAINING PROGRAM

## 2024-01-09 PROCEDURE — 1036F TOBACCO NON-USER: CPT | Performed by: INTERNAL MEDICINE

## 2024-01-09 PROCEDURE — 1123F ACP DISCUSS/DSCN MKR DOCD: CPT | Performed by: INTERNAL MEDICINE

## 2024-01-09 PROCEDURE — G8484 FLU IMMUNIZE NO ADMIN: HCPCS | Performed by: INTERNAL MEDICINE

## 2024-01-09 PROCEDURE — G8484 FLU IMMUNIZE NO ADMIN: HCPCS | Performed by: STUDENT IN AN ORGANIZED HEALTH CARE EDUCATION/TRAINING PROGRAM

## 2024-01-09 PROCEDURE — 3078F DIAST BP <80 MM HG: CPT | Performed by: STUDENT IN AN ORGANIZED HEALTH CARE EDUCATION/TRAINING PROGRAM

## 2024-01-09 PROCEDURE — 3075F SYST BP GE 130 - 139MM HG: CPT | Performed by: STUDENT IN AN ORGANIZED HEALTH CARE EDUCATION/TRAINING PROGRAM

## 2024-01-09 PROCEDURE — G8417 CALC BMI ABV UP PARAM F/U: HCPCS | Performed by: INTERNAL MEDICINE

## 2024-01-09 RX ORDER — TIZANIDINE 2 MG/1
2 TABLET ORAL 2 TIMES DAILY PRN
Qty: 30 TABLET | Refills: 0 | Status: SHIPPED | OUTPATIENT
Start: 2024-01-09

## 2024-01-09 RX ORDER — HYDROCHLOROTHIAZIDE 25 MG/1
25 TABLET ORAL DAILY
Qty: 180 TABLET | Refills: 3
Start: 2024-01-09

## 2024-01-09 RX ORDER — METOPROLOL SUCCINATE 50 MG/1
50 TABLET, EXTENDED RELEASE ORAL DAILY
Qty: 90 TABLET | Refills: 3 | Status: SHIPPED | OUTPATIENT
Start: 2024-01-09

## 2024-01-09 RX ORDER — LEVOTHYROXINE SODIUM 0.2 MG/1
200 TABLET ORAL DAILY
Qty: 90 TABLET | Refills: 3 | Status: SHIPPED | OUTPATIENT
Start: 2024-01-09

## 2024-01-09 ASSESSMENT — PATIENT HEALTH QUESTIONNAIRE - PHQ9
SUM OF ALL RESPONSES TO PHQ QUESTIONS 1-9: 0
1. LITTLE INTEREST OR PLEASURE IN DOING THINGS: 0
SUM OF ALL RESPONSES TO PHQ9 QUESTIONS 1 & 2: 0
SUM OF ALL RESPONSES TO PHQ QUESTIONS 1-9: 0
2. FEELING DOWN, DEPRESSED OR HOPELESS: 0

## 2024-01-09 NOTE — PROGRESS NOTES
[facility] CARDIOLOGY  [address] [place] [address], [address]  [place] [address]  PHONE: [phone]        NAME:  Sameer Sotelo  : 1940  MRN: [id number]     PCP:  Jada Lima MD      SUBJECTIVE:   Sameer Sotelo is a 83 y.o. male seen for a follow up visit regarding the following:     Chief Complaint   Patient presents with    Hyperlipidemia    Hypertension    Tachycardia       HPI:    He had MRI safe dual-chamber pacer implantation for complete heart block in December of last year.  EF at that time was normal with no severe valvular pathology.  He presents for follow-up today.     Pacemaker site and interrogation look good with ~100% RV pacing and good lead and battery integrity but no CHF or palpitation symptoms.  Working without limitations.     Nuclear stress test 2023:  1. Stress EKG: non diagnostic due to equivocal EKG changes  2. SPECT Perfusion Imaging: Abnormal due to infarction in the inferior wall, no obvious ischemia seen.   3. LV Systolic Function is mildly abnormal, EF 47%  4. Risk Assessment: Low risk     Echo 2023:  Left Ventricle Normal left ventricular systolic function with a visually estimated EF of 55 - 60%. Left ventricle size is normal. Mildly increased wall thickness. Normal wall motion. Septal bounce noted Indeterminate diastolic function.   Left Atrium Left atrium is dilated.   Right Ventricle Right ventricle size is normal. Normal systolic function.   Right Atrium Right atrium size is normal.   Aortic Valve Valve structure is normal. No regurgitation. No stenosis.   Mitral Valve Valve structure is normal. Trace regurgitation. No stenosis noted.   Tricuspid Valve Valve structure is normal. No regurgitation. No stenosis noted. The estimated RVSP is 28 mmHg.   Pulmonic Valve Valve structure is normal. No regurgitation. No stenosis noted.   Aorta Normal sized aorta.   IVC/Hepatic Veins IVC diameter is less than or equal to 21 mm and decreases greater than 50%

## 2024-01-09 NOTE — PROGRESS NOTES
FOLLOW UP VISIT    Subjective:    Sameer Sotelo (: 1940) is a 83 y.o., male,   Chief Complaint   Patient presents with    [facility]    Diabetes    Hypertension    Thyroid Problem       HPI:    Here with wife. No complaints. His prior PCP is retiringAnd this is my first time seeing him.    History of complete heart block status post PPM: Follows with cardiology  CKD: avoids NSAIDs    DM2: fasting glucose was 140 yesterday. Takes glipizide-metformin 5-5-- mg 2 AM 1 PM.  Hypothyroid: On levothyroxine  HTN: Doesn't check at home. On amlodipine 5 mg daily, losartan 100 mg daily, hctz 50 mg daily  BPH: Follows with urology, mildly elevated PSA history    The following portions of the patient's history were reviewed and updated as appropriate:      Patient Active Problem List    Diagnosis Date Noted    Complete heart block (HCC) 2022    BPH with obstruction/lower urinary tract symptoms 2022    Chronic renal disease, stage III (HCC) [913738] 2023    Abnormal nuclear cardiac imaging test 2023    Pacemaker 2023    SOB (shortness of breath) 2023    Accelerating angina (HCC) 2023    NSVT (nonsustained ventricular tachycardia) ([facility]) 2023    Sebaceous cyst 2023    Hematuria 2018    Hypercholesterolemia 2018    Constipation 2017    Elevated PSA 2017    Acquired hypothyroidism 2017    Controlled type 2 diabetes mellitus with diabetic neuropathy, without long-term current use of insulin (HCC) 2017    Essential hypertension 2017    Osteoarthritis of multiple joints 2015    S/P total knee replacement 2015    Benign prostatic hyperplasia with lower urinary tract symptoms 06/10/2014    Urinary retention 06/10/2014      Past Medical History:   Diagnosis Date    Chronic pain     arthritic    Chronic renal disease, stage III (HCC) [[id number]] 2023    Complete heart block (HCC) 2022    Enlarged prostate     \"improved

## 2024-01-10 LAB
EST. AVERAGE GLUCOSE BLD GHB EST-MCNC: 154 MG/DL
HBA1C MFR BLD: 7 % (ref 4.8–5.6)

## 2024-01-24 PROCEDURE — 93294 REM INTERROG EVL PM/LDLS PM: CPT | Performed by: INTERNAL MEDICINE

## 2024-01-24 PROCEDURE — 93296 REM INTERROG EVL PM/IDS: CPT | Performed by: INTERNAL MEDICINE

## 2024-04-04 ENCOUNTER — NURSE ONLY (OUTPATIENT)
Age: 84
End: 2024-04-04

## 2024-04-04 DIAGNOSIS — E11.40 CONTROLLED TYPE 2 DIABETES MELLITUS WITH DIABETIC NEUROPATHY, WITHOUT LONG-TERM CURRENT USE OF INSULIN (HCC): Primary | ICD-10-CM

## 2024-04-04 DIAGNOSIS — I44.2 COMPLETE HEART BLOCK (HCC): Primary | ICD-10-CM

## 2024-04-05 PROBLEM — E11.40 CONTROLLED TYPE 2 DIABETES MELLITUS WITH DIABETIC NEUROPATHY, WITHOUT LONG-TERM CURRENT USE OF INSULIN (HCC): Chronic | Status: ACTIVE | Noted: 2017-01-25

## 2024-04-05 RX ORDER — BLOOD SUGAR DIAGNOSTIC
1 STRIP MISCELLANEOUS DAILY
Qty: 100 STRIP | Refills: 11 | Status: SHIPPED | OUTPATIENT
Start: 2024-04-05

## 2024-05-10 ENCOUNTER — OFFICE VISIT (OUTPATIENT)
Dept: INTERNAL MEDICINE CLINIC | Facility: CLINIC | Age: 84
End: 2024-05-10
Payer: MEDICARE

## 2024-05-10 VITALS
SYSTOLIC BLOOD PRESSURE: 138 MMHG | DIASTOLIC BLOOD PRESSURE: 72 MMHG | BODY MASS INDEX: 29.96 KG/M2 | WEIGHT: 214 LBS | OXYGEN SATURATION: 97 % | HEART RATE: 71 BPM | HEIGHT: 71 IN

## 2024-05-10 DIAGNOSIS — E78.00 HYPERCHOLESTEROLEMIA: ICD-10-CM

## 2024-05-10 DIAGNOSIS — I10 ESSENTIAL HYPERTENSION: ICD-10-CM

## 2024-05-10 DIAGNOSIS — E03.9 ACQUIRED HYPOTHYROIDISM: ICD-10-CM

## 2024-05-10 DIAGNOSIS — D64.9 ANEMIA, UNSPECIFIED TYPE: ICD-10-CM

## 2024-05-10 DIAGNOSIS — R25.2 LEG CRAMP: ICD-10-CM

## 2024-05-10 DIAGNOSIS — E11.40 CONTROLLED TYPE 2 DIABETES MELLITUS WITH DIABETIC NEUROPATHY, WITHOUT LONG-TERM CURRENT USE OF INSULIN (HCC): ICD-10-CM

## 2024-05-10 DIAGNOSIS — R25.2 LEG CRAMP: Primary | ICD-10-CM

## 2024-05-10 LAB
ALBUMIN SERPL-MCNC: 3.9 G/DL (ref 3.2–4.6)
ALBUMIN/GLOB SERPL: 1.3 (ref 1–1.9)
ALP SERPL-CCNC: 89 U/L (ref 40–129)
ALT SERPL-CCNC: 34 U/L (ref 12–65)
ANION GAP SERPL CALC-SCNC: 13 MMOL/L (ref 9–18)
AST SERPL-CCNC: 40 U/L (ref 15–37)
BASOPHILS # BLD: 0 K/UL (ref 0–0.2)
BASOPHILS NFR BLD: 1 % (ref 0–2)
BILIRUB SERPL-MCNC: 1 MG/DL (ref 0–1.2)
BUN SERPL-MCNC: 20 MG/DL (ref 8–23)
CALCIUM SERPL-MCNC: 9.4 MG/DL (ref 8.8–10.2)
CHLORIDE SERPL-SCNC: 104 MMOL/L (ref 98–107)
CO2 SERPL-SCNC: 25 MMOL/L (ref 20–28)
CREAT SERPL-MCNC: 1.17 MG/DL (ref 0.8–1.3)
DIFFERENTIAL METHOD BLD: ABNORMAL
EOSINOPHIL # BLD: 0.1 K/UL (ref 0–0.8)
EOSINOPHIL NFR BLD: 2 % (ref 0.5–7.8)
ERYTHROCYTE [DISTWIDTH] IN BLOOD BY AUTOMATED COUNT: 13.8 % (ref 11.9–14.6)
EST. AVERAGE GLUCOSE BLD GHB EST-MCNC: 165 MG/DL
FERRITIN SERPL-MCNC: 21 NG/ML (ref 8–388)
GLOBULIN SER CALC-MCNC: 2.9 G/DL (ref 2.3–3.5)
GLUCOSE SERPL-MCNC: 149 MG/DL (ref 70–99)
HBA1C MFR BLD: 7.4 % (ref 0–5.6)
HCT VFR BLD AUTO: 42.2 % (ref 41.1–50.3)
HGB BLD-MCNC: 13.6 G/DL (ref 13.6–17.2)
IMM GRANULOCYTES # BLD AUTO: 0 K/UL (ref 0–0.5)
IMM GRANULOCYTES NFR BLD AUTO: 0 % (ref 0–5)
IRON SATN MFR SERPL: 20 % (ref 20–50)
IRON SERPL-MCNC: 76 UG/DL (ref 35–100)
LYMPHOCYTES # BLD: 1.7 K/UL (ref 0.5–4.6)
LYMPHOCYTES NFR BLD: 41 % (ref 13–44)
MAGNESIUM SERPL-MCNC: 1.7 MG/DL (ref 1.8–2.4)
MCH RBC QN AUTO: 28.9 PG (ref 26.1–32.9)
MCHC RBC AUTO-ENTMCNC: 32.2 G/DL (ref 31.4–35)
MCV RBC AUTO: 89.6 FL (ref 82–102)
MONOCYTES # BLD: 0.3 K/UL (ref 0.1–1.3)
MONOCYTES NFR BLD: 8 % (ref 4–12)
NEUTS SEG # BLD: 2 K/UL (ref 1.7–8.2)
NEUTS SEG NFR BLD: 48 % (ref 43–78)
NRBC # BLD: 0 K/UL (ref 0–0.2)
PLATELET # BLD AUTO: 177 K/UL (ref 150–450)
PMV BLD AUTO: 11.2 FL (ref 9.4–12.3)
POTASSIUM SERPL-SCNC: 4 MMOL/L (ref 3.5–5.1)
PROT SERPL-MCNC: 6.9 G/DL (ref 6.3–8.2)
RBC # BLD AUTO: 4.71 M/UL (ref 4.23–5.6)
SODIUM SERPL-SCNC: 141 MMOL/L (ref 136–145)
TIBC SERPL-MCNC: 377 UG/DL (ref 240–450)
TSH, 3RD GENERATION: 4.52 UIU/ML (ref 0.27–4.2)
UIBC SERPL-MCNC: 301 UG/DL (ref 112–347)
WBC # BLD AUTO: 4 K/UL (ref 4.3–11.1)

## 2024-05-10 PROCEDURE — G8417 CALC BMI ABV UP PARAM F/U: HCPCS | Performed by: STUDENT IN AN ORGANIZED HEALTH CARE EDUCATION/TRAINING PROGRAM

## 2024-05-10 PROCEDURE — 3075F SYST BP GE 130 - 139MM HG: CPT | Performed by: STUDENT IN AN ORGANIZED HEALTH CARE EDUCATION/TRAINING PROGRAM

## 2024-05-10 PROCEDURE — 1123F ACP DISCUSS/DSCN MKR DOCD: CPT | Performed by: STUDENT IN AN ORGANIZED HEALTH CARE EDUCATION/TRAINING PROGRAM

## 2024-05-10 PROCEDURE — 1036F TOBACCO NON-USER: CPT | Performed by: STUDENT IN AN ORGANIZED HEALTH CARE EDUCATION/TRAINING PROGRAM

## 2024-05-10 PROCEDURE — 3051F HG A1C>EQUAL 7.0%<8.0%: CPT | Performed by: STUDENT IN AN ORGANIZED HEALTH CARE EDUCATION/TRAINING PROGRAM

## 2024-05-10 PROCEDURE — G8427 DOCREV CUR MEDS BY ELIG CLIN: HCPCS | Performed by: STUDENT IN AN ORGANIZED HEALTH CARE EDUCATION/TRAINING PROGRAM

## 2024-05-10 PROCEDURE — 3078F DIAST BP <80 MM HG: CPT | Performed by: STUDENT IN AN ORGANIZED HEALTH CARE EDUCATION/TRAINING PROGRAM

## 2024-05-10 PROCEDURE — 99214 OFFICE O/P EST MOD 30 MIN: CPT | Performed by: STUDENT IN AN ORGANIZED HEALTH CARE EDUCATION/TRAINING PROGRAM

## 2024-05-10 RX ORDER — AMLODIPINE BESYLATE 5 MG/1
5 TABLET ORAL DAILY
Qty: 90 TABLET | Refills: 3 | Status: SHIPPED | OUTPATIENT
Start: 2024-05-10

## 2024-05-10 RX ORDER — LOSARTAN POTASSIUM 100 MG/1
100 TABLET ORAL DAILY
Qty: 90 TABLET | Refills: 3 | Status: SHIPPED | OUTPATIENT
Start: 2024-05-10

## 2024-05-10 RX ORDER — ATORVASTATIN CALCIUM 20 MG/1
20 TABLET, FILM COATED ORAL DAILY
Qty: 90 TABLET | Refills: 3 | Status: SHIPPED | OUTPATIENT
Start: 2024-05-10

## 2024-05-10 RX ORDER — LEVOTHYROXINE SODIUM 0.03 MG/1
25 TABLET ORAL DAILY
Qty: 90 TABLET | Refills: 3 | Status: SHIPPED | OUTPATIENT
Start: 2024-05-10

## 2024-05-10 RX ORDER — LEVOTHYROXINE SODIUM 0.2 MG/1
200 TABLET ORAL DAILY
Qty: 90 TABLET | Refills: 3 | Status: SHIPPED | OUTPATIENT
Start: 2024-05-10

## 2024-05-10 RX ORDER — GLIPIZIDE AND METFORMIN HCL 5; 500 MG/1; MG/1
TABLET, FILM COATED ORAL
Qty: 315 TABLET | Refills: 3 | Status: CANCELLED | OUTPATIENT
Start: 2024-05-10

## 2024-05-10 RX ORDER — GLIPIZIDE AND METFORMIN HCL 5; 500 MG/1; MG/1
1 TABLET, FILM COATED ORAL
Qty: 360 TABLET | Refills: 0
Start: 2024-05-10

## 2024-05-10 RX ORDER — HYDROCHLOROTHIAZIDE 25 MG/1
25 TABLET ORAL DAILY
Qty: 90 TABLET | Refills: 3 | Status: SHIPPED | OUTPATIENT
Start: 2024-05-10

## 2024-05-10 SDOH — ECONOMIC STABILITY: HOUSING INSECURITY
IN THE LAST 12 MONTHS, WAS THERE A TIME WHEN YOU DID NOT HAVE A STEADY PLACE TO SLEEP OR SLEPT IN A SHELTER (INCLUDING NOW)?: NO

## 2024-05-10 SDOH — ECONOMIC STABILITY: FOOD INSECURITY: WITHIN THE PAST 12 MONTHS, THE FOOD YOU BOUGHT JUST DIDN'T LAST AND YOU DIDN'T HAVE MONEY TO GET MORE.: NEVER TRUE

## 2024-05-10 SDOH — ECONOMIC STABILITY: FOOD INSECURITY: WITHIN THE PAST 12 MONTHS, YOU WORRIED THAT YOUR FOOD WOULD RUN OUT BEFORE YOU GOT MONEY TO BUY MORE.: NEVER TRUE

## 2024-05-10 SDOH — ECONOMIC STABILITY: INCOME INSECURITY: HOW HARD IS IT FOR YOU TO PAY FOR THE VERY BASICS LIKE FOOD, HOUSING, MEDICAL CARE, AND HEATING?: SOMEWHAT HARD

## 2024-05-10 ASSESSMENT — PATIENT HEALTH QUESTIONNAIRE - PHQ9
SUM OF ALL RESPONSES TO PHQ QUESTIONS 1-9: 0
SUM OF ALL RESPONSES TO PHQ9 QUESTIONS 1 & 2: 0
2. FEELING DOWN, DEPRESSED OR HOPELESS: NOT AT ALL
1. LITTLE INTEREST OR PLEASURE IN DOING THINGS: NOT AT ALL
SUM OF ALL RESPONSES TO PHQ QUESTIONS 1-9: 0

## 2024-05-10 ASSESSMENT — ENCOUNTER SYMPTOMS: SHORTNESS OF BREATH: 0

## 2024-05-10 NOTE — PROGRESS NOTES
FOLLOW UP VISIT    Subjective:    Sameer Sotelo (: 1940) is a 84 y.o., male,   Chief Complaint   Patient presents with    Diabetes    Hypertension    cramps       HPI:    Having cramps in his arms, hands, fingers, calves, started this year or last year, intermittent.  He drinks about 8 glasses of water per day.  He continues to work as a .    Glucose this morning was 170    Gets eyes checked.    The following portions of the patient's history were reviewed and updated as appropriate:      Current Outpatient Medications   Medication Sig Dispense Refill    amLODIPine (NORVASC) 5 MG tablet Take 1 tablet by mouth daily 90 tablet 3    atorvastatin (LIPITOR) 20 MG tablet Take 1 tablet by mouth daily 90 tablet 3    hydroCHLOROthiazide (HYDRODIURIL) 25 MG tablet Take 1 tablet by mouth daily 90 tablet 3    levothyroxine (SYNTHROID) 200 MCG tablet Take 1 tablet by mouth daily 90 tablet 3    levothyroxine (SYNTHROID) 25 MCG tablet Take 1 tablet by mouth daily 90 tablet 3    losartan (COZAAR) 100 MG tablet Take 1 tablet by mouth daily 90 tablet 3    blood glucose test strips (ACCU-CHEK GUIDE) strip Inject 1 each into the skin daily 100 strip 11    metoprolol succinate (TOPROL XL) 50 MG extended release tablet Take 1 tablet by mouth daily 90 tablet 3    glipiZIDE-metFORMIN (METAGLIP) 5-500 MG per tablet Take 2 tabs qam and 1.5 tabs qhs. (Patient taking differently: Take 1 tablet by mouth 2 times daily (before meals) Take 2 tabs qam and 1 tab qhs.) 315 tablet 3    vitamin B-12 (CYANOCOBALAMIN) 100 MCG tablet Take 10 tablets by mouth daily      acetaminophen (TYLENOL) 500 MG tablet Take 1 tablet by mouth every 6 hours as needed for Pain      bimatoprost (LUMIGAN) 0.01 % SOLN ophthalmic drops Place 1 drop into both eyes nightly      tiZANidine (ZANAFLEX) 2 MG tablet Take 1 tablet by mouth 2 times daily as needed (pain, muscle spasm) 30 tablet 0     No current facility-administered medications for this visit.

## 2024-05-16 NOTE — RESULT ENCOUNTER NOTE
Spoke to patient to give lab results and message from Dr. Lima as follows:     Magnesium is a little low, I recommend taking magnesium supplement and this may help cramps.  A1c is 7.4 previously was 7.0, could be better, work on healthy diet less carbohydrates. Also the thyroid lab is slightly undertreated, take extra 25 mcg 1 day of the week.    Patient states understanding. Will get OTC magnesium supplement and take extra 25mcg tab of Levothyroxine one day per week.

## 2024-06-10 ENCOUNTER — TELEPHONE (OUTPATIENT)
Dept: INTERNAL MEDICINE CLINIC | Facility: CLINIC | Age: 84
End: 2024-06-10

## 2024-06-10 NOTE — TELEPHONE ENCOUNTER
I confirmed with Dr. Lima that per her lab note; \" the thyroid lab is slightly undertreated, take extra 25 mcg 1 day of the week.\"  I called and spoke with Mrs Sotelo and let her know of the instructions to take the 200 daily and the 25 just one day a week. She voiced understanding.

## 2024-08-05 PROCEDURE — 93294 REM INTERROG EVL PM/LDLS PM: CPT | Performed by: INTERNAL MEDICINE

## 2024-08-05 PROCEDURE — 93296 REM INTERROG EVL PM/IDS: CPT | Performed by: INTERNAL MEDICINE

## 2024-08-08 RX ORDER — GLUCOSAMINE HCL/CHONDROITIN SU 500-400 MG
CAPSULE ORAL
Qty: 100 STRIP | Refills: 11 | Status: SHIPPED | OUTPATIENT
Start: 2024-08-08

## 2024-08-08 NOTE — TELEPHONE ENCOUNTER
Request for Nisa Plus test strips to [facility] on [facility]; script sent in April for Accu-Lucillek

## 2024-08-29 DIAGNOSIS — E11.40 CONTROLLED TYPE 2 DIABETES MELLITUS WITH DIABETIC NEUROPATHY, WITHOUT LONG-TERM CURRENT USE OF INSULIN (HCC): Primary | Chronic | ICD-10-CM

## 2024-08-29 RX ORDER — BLOOD-GLUCOSE METER
1 EACH MISCELLANEOUS DAILY
Qty: 1 KIT | Refills: 0 | Status: SHIPPED | OUTPATIENT
Start: 2024-08-29

## 2024-08-29 NOTE — TELEPHONE ENCOUNTER
Patients wife is requesting a new prescription for a glucose monitor and test strips she states [facility] is no longer in stock and would like this sent to [facility]/pharmacy #[id number] - [place], [place] - [address] [place] [address] -  [phone] - f [phone]. OV is scheduled for 9/10/24 with Dr. Lima. Please advise.

## 2024-08-30 RX ORDER — GLUCOSAMINE HCL/CHONDROITIN SU 500-400 MG
CAPSULE ORAL
Qty: 100 STRIP | Refills: 3 | Status: SHIPPED | OUTPATIENT
Start: 2024-08-30

## 2024-08-30 RX ORDER — BLOOD-GLUCOSE METER
1 KIT MISCELLANEOUS DAILY
Qty: 1 KIT | Refills: 0 | Status: SHIPPED | OUTPATIENT
Start: 2024-08-30

## 2024-09-10 ENCOUNTER — OFFICE VISIT (OUTPATIENT)
Dept: INTERNAL MEDICINE CLINIC | Facility: CLINIC | Age: 84
End: 2024-09-10
Payer: MEDICARE

## 2024-09-10 VITALS
WEIGHT: 210 LBS | SYSTOLIC BLOOD PRESSURE: 122 MMHG | HEIGHT: 71 IN | DIASTOLIC BLOOD PRESSURE: 60 MMHG | HEART RATE: 71 BPM | OXYGEN SATURATION: 97 % | BODY MASS INDEX: 29.4 KG/M2

## 2024-09-10 DIAGNOSIS — E11.40 CONTROLLED TYPE 2 DIABETES MELLITUS WITH DIABETIC NEUROPATHY, WITHOUT LONG-TERM CURRENT USE OF INSULIN (HCC): ICD-10-CM

## 2024-09-10 DIAGNOSIS — E03.9 ACQUIRED HYPOTHYROIDISM: ICD-10-CM

## 2024-09-10 DIAGNOSIS — E83.42 HYPOMAGNESEMIA: Primary | ICD-10-CM

## 2024-09-10 DIAGNOSIS — I10 ESSENTIAL HYPERTENSION: ICD-10-CM

## 2024-09-10 DIAGNOSIS — E78.00 HYPERCHOLESTEROLEMIA: ICD-10-CM

## 2024-09-10 PROBLEM — I87.2 STASIS DERMATITIS: Status: RESOLVED | Noted: 2024-09-10 | Resolved: 2024-09-10

## 2024-09-10 PROBLEM — I87.2 STASIS DERMATITIS: Status: ACTIVE | Noted: 2024-09-10

## 2024-09-10 PROBLEM — I25.10 ASCVD (ARTERIOSCLEROTIC CARDIOVASCULAR DISEASE): Status: ACTIVE | Noted: 2023-05-18

## 2024-09-10 LAB
ALBUMIN SERPL-MCNC: 3.6 G/DL (ref 3.2–4.6)
ALBUMIN/GLOB SERPL: 1.2 (ref 1–1.9)
ALP SERPL-CCNC: 90 U/L (ref 40–129)
ALT SERPL-CCNC: 27 U/L (ref 12–65)
ANION GAP SERPL CALC-SCNC: 10 MMOL/L (ref 9–18)
AST SERPL-CCNC: 24 U/L (ref 15–37)
BASOPHILS # BLD: 0 K/UL (ref 0–0.2)
BASOPHILS NFR BLD: 1 % (ref 0–2)
BILIRUB SERPL-MCNC: 0.5 MG/DL (ref 0–1.2)
BUN SERPL-MCNC: 15 MG/DL (ref 8–23)
CALCIUM SERPL-MCNC: 9.2 MG/DL (ref 8.8–10.2)
CHLORIDE SERPL-SCNC: 106 MMOL/L (ref 98–107)
CO2 SERPL-SCNC: 27 MMOL/L (ref 20–28)
CREAT SERPL-MCNC: 1.17 MG/DL (ref 0.8–1.3)
DIFFERENTIAL METHOD BLD: NORMAL
EOSINOPHIL # BLD: 0.1 K/UL (ref 0–0.8)
EOSINOPHIL NFR BLD: 2 % (ref 0.5–7.8)
ERYTHROCYTE [DISTWIDTH] IN BLOOD BY AUTOMATED COUNT: 13.1 % (ref 11.9–14.6)
EST. AVERAGE GLUCOSE BLD GHB EST-MCNC: 154 MG/DL
GLOBULIN SER CALC-MCNC: 3 G/DL (ref 2.3–3.5)
GLUCOSE SERPL-MCNC: 163 MG/DL (ref 70–99)
HBA1C MFR BLD: 7 % (ref 0–5.6)
HCT VFR BLD AUTO: 44.6 % (ref 41.1–50.3)
HGB BLD-MCNC: 14.1 G/DL (ref 13.6–17.2)
IMM GRANULOCYTES # BLD AUTO: 0 K/UL (ref 0–0.5)
IMM GRANULOCYTES NFR BLD AUTO: 0 % (ref 0–5)
LYMPHOCYTES # BLD: 1.7 K/UL (ref 0.5–4.6)
LYMPHOCYTES NFR BLD: 39 % (ref 13–44)
MCH RBC QN AUTO: 29.7 PG (ref 26.1–32.9)
MCHC RBC AUTO-ENTMCNC: 31.6 G/DL (ref 31.4–35)
MCV RBC AUTO: 93.9 FL (ref 82–102)
MONOCYTES # BLD: 0.5 K/UL (ref 0.1–1.3)
MONOCYTES NFR BLD: 10 % (ref 4–12)
NEUTS SEG # BLD: 2.1 K/UL (ref 1.7–8.2)
NEUTS SEG NFR BLD: 48 % (ref 43–78)
NRBC # BLD: 0 K/UL (ref 0–0.2)
PLATELET # BLD AUTO: 218 K/UL (ref 150–450)
PMV BLD AUTO: 11.2 FL (ref 9.4–12.3)
POTASSIUM SERPL-SCNC: 4.6 MMOL/L (ref 3.5–5.1)
PROT SERPL-MCNC: 6.6 G/DL (ref 6.3–8.2)
RBC # BLD AUTO: 4.75 M/UL (ref 4.23–5.6)
SODIUM SERPL-SCNC: 143 MMOL/L (ref 136–145)
TSH, 3RD GENERATION: 1.62 UIU/ML (ref 0.27–4.2)
WBC # BLD AUTO: 4.4 K/UL (ref 4.3–11.1)

## 2024-09-10 PROCEDURE — 99214 OFFICE O/P EST MOD 30 MIN: CPT | Performed by: STUDENT IN AN ORGANIZED HEALTH CARE EDUCATION/TRAINING PROGRAM

## 2024-09-10 PROCEDURE — 1036F TOBACCO NON-USER: CPT | Performed by: STUDENT IN AN ORGANIZED HEALTH CARE EDUCATION/TRAINING PROGRAM

## 2024-09-10 PROCEDURE — G8427 DOCREV CUR MEDS BY ELIG CLIN: HCPCS | Performed by: STUDENT IN AN ORGANIZED HEALTH CARE EDUCATION/TRAINING PROGRAM

## 2024-09-10 PROCEDURE — G8417 CALC BMI ABV UP PARAM F/U: HCPCS | Performed by: STUDENT IN AN ORGANIZED HEALTH CARE EDUCATION/TRAINING PROGRAM

## 2024-09-10 PROCEDURE — 1123F ACP DISCUSS/DSCN MKR DOCD: CPT | Performed by: STUDENT IN AN ORGANIZED HEALTH CARE EDUCATION/TRAINING PROGRAM

## 2024-09-10 PROCEDURE — 3074F SYST BP LT 130 MM HG: CPT | Performed by: STUDENT IN AN ORGANIZED HEALTH CARE EDUCATION/TRAINING PROGRAM

## 2024-09-10 PROCEDURE — 3051F HG A1C>EQUAL 7.0%<8.0%: CPT | Performed by: STUDENT IN AN ORGANIZED HEALTH CARE EDUCATION/TRAINING PROGRAM

## 2024-09-10 PROCEDURE — 3078F DIAST BP <80 MM HG: CPT | Performed by: STUDENT IN AN ORGANIZED HEALTH CARE EDUCATION/TRAINING PROGRAM

## 2024-09-10 RX ORDER — CALCIUM CARBONATE 300MG(750)
400 TABLET,CHEWABLE ORAL DAILY
Qty: 30 TABLET | Refills: 0 | Status: SHIPPED | OUTPATIENT
Start: 2024-09-10

## 2024-09-10 RX ORDER — GLIPIZIDE AND METFORMIN HCL 5; 500 MG/1; MG/1
TABLET, FILM COATED ORAL
Qty: 360 TABLET | Refills: 2 | Status: SHIPPED | OUTPATIENT
Start: 2024-09-10

## 2024-09-10 RX ORDER — GLUCOSAMINE HCL/CHONDROITIN SU 500-400 MG
CAPSULE ORAL
Qty: 100 STRIP | Refills: 11 | Status: SHIPPED | OUTPATIENT
Start: 2024-09-10

## 2024-09-10 RX ORDER — BLOOD-GLUCOSE METER
KIT MISCELLANEOUS
Qty: 1 KIT | Refills: 0 | Status: SHIPPED | OUTPATIENT
Start: 2024-09-10

## 2024-09-10 RX ORDER — LEVOTHYROXINE SODIUM 25 UG/1
25 TABLET ORAL DAILY
Qty: 90 TABLET | Refills: 3 | Status: SHIPPED | OUTPATIENT
Start: 2024-09-10

## 2024-09-10 RX ORDER — LANCETS 30 GAUGE
EACH MISCELLANEOUS
Qty: 100 EACH | Refills: 11 | Status: SHIPPED | OUTPATIENT
Start: 2024-09-10

## 2024-09-10 ASSESSMENT — PATIENT HEALTH QUESTIONNAIRE - PHQ9
2. FEELING DOWN, DEPRESSED OR HOPELESS: NOT AT ALL
SUM OF ALL RESPONSES TO PHQ QUESTIONS 1-9: 0
1. LITTLE INTEREST OR PLEASURE IN DOING THINGS: NOT AT ALL
SUM OF ALL RESPONSES TO PHQ QUESTIONS 1-9: 0
SUM OF ALL RESPONSES TO PHQ9 QUESTIONS 1 & 2: 0
SUM OF ALL RESPONSES TO PHQ QUESTIONS 1-9: 0
SUM OF ALL RESPONSES TO PHQ QUESTIONS 1-9: 0

## 2024-10-04 DIAGNOSIS — E83.42 HYPOMAGNESEMIA: ICD-10-CM

## 2024-10-04 RX ORDER — LANOLIN ALCOHOL/MO/W.PET/CERES
400 CREAM (GRAM) TOPICAL DAILY
Qty: 90 TABLET | Refills: 0 | Status: SHIPPED | OUTPATIENT
Start: 2024-10-04

## 2025-01-08 ENCOUNTER — OFFICE VISIT (OUTPATIENT)
Dept: INTERNAL MEDICINE CLINIC | Facility: CLINIC | Age: 85
End: 2025-01-08

## 2025-01-08 VITALS
WEIGHT: 217 LBS | DIASTOLIC BLOOD PRESSURE: 70 MMHG | OXYGEN SATURATION: 96 % | HEART RATE: 72 BPM | SYSTOLIC BLOOD PRESSURE: 128 MMHG | BODY MASS INDEX: 30.38 KG/M2 | HEIGHT: 71 IN

## 2025-01-08 DIAGNOSIS — E03.9 ACQUIRED HYPOTHYROIDISM: ICD-10-CM

## 2025-01-08 DIAGNOSIS — E83.42 HYPOMAGNESEMIA: ICD-10-CM

## 2025-01-08 DIAGNOSIS — I10 ESSENTIAL HYPERTENSION: ICD-10-CM

## 2025-01-08 DIAGNOSIS — E11.40 CONTROLLED TYPE 2 DIABETES MELLITUS WITH DIABETIC NEUROPATHY, WITHOUT LONG-TERM CURRENT USE OF INSULIN (HCC): Primary | Chronic | ICD-10-CM

## 2025-01-08 DIAGNOSIS — R97.20 ELEVATED PSA: ICD-10-CM

## 2025-01-08 DIAGNOSIS — E78.00 HYPERCHOLESTEROLEMIA: ICD-10-CM

## 2025-01-08 DIAGNOSIS — N18.31 STAGE 3A CHRONIC KIDNEY DISEASE (HCC): ICD-10-CM

## 2025-01-08 DIAGNOSIS — R97.20 ELEVATED PROSTATE SPECIFIC ANTIGEN (PSA): ICD-10-CM

## 2025-01-08 DIAGNOSIS — E11.40 CONTROLLED TYPE 2 DIABETES MELLITUS WITH DIABETIC NEUROPATHY, WITHOUT LONG-TERM CURRENT USE OF INSULIN (HCC): Chronic | ICD-10-CM

## 2025-01-08 DIAGNOSIS — I44.2 COMPLETE HEART BLOCK (HCC): ICD-10-CM

## 2025-01-08 LAB
ALBUMIN SERPL-MCNC: 3.8 G/DL (ref 3.2–4.6)
ALBUMIN/GLOB SERPL: 1.2 (ref 1–1.9)
ALP SERPL-CCNC: 88 U/L (ref 40–129)
ALT SERPL-CCNC: 33 U/L (ref 8–55)
ANION GAP SERPL CALC-SCNC: 10 MMOL/L (ref 7–16)
APPEARANCE UR: CLEAR
AST SERPL-CCNC: 64 U/L (ref 15–37)
BASOPHILS # BLD: 0.04 K/UL (ref 0–0.2)
BASOPHILS NFR BLD: 0.9 % (ref 0–2)
BILIRUB SERPL-MCNC: 1 MG/DL (ref 0–1.2)
BILIRUB UR QL: NEGATIVE
BUN SERPL-MCNC: 18 MG/DL (ref 8–23)
CALCIUM SERPL-MCNC: 9.2 MG/DL (ref 8.8–10.2)
CHLORIDE SERPL-SCNC: 107 MMOL/L (ref 98–107)
CHOLEST SERPL-MCNC: 141 MG/DL (ref 0–200)
CO2 SERPL-SCNC: 24 MMOL/L (ref 20–29)
COLOR UR: NORMAL
CREAT SERPL-MCNC: 1.27 MG/DL (ref 0.8–1.3)
CREAT UR-MCNC: 159 MG/DL (ref 39–259)
DIFFERENTIAL METHOD BLD: NORMAL
EOSINOPHIL # BLD: 0.09 K/UL (ref 0–0.8)
EOSINOPHIL NFR BLD: 1.9 % (ref 0.5–7.8)
ERYTHROCYTE [DISTWIDTH] IN BLOOD BY AUTOMATED COUNT: 13.7 % (ref 11.9–14.6)
EST. AVERAGE GLUCOSE BLD GHB EST-MCNC: 157 MG/DL
GLOBULIN SER CALC-MCNC: 3.1 G/DL (ref 2.3–3.5)
GLUCOSE SERPL-MCNC: 110 MG/DL (ref 70–99)
GLUCOSE UR STRIP.AUTO-MCNC: NEGATIVE MG/DL
HBA1C MFR BLD: 7.1 % (ref 0–5.6)
HCT VFR BLD AUTO: 41.9 % (ref 41.1–50.3)
HDLC SERPL-MCNC: 53 MG/DL (ref 40–60)
HDLC SERPL: 2.7 (ref 0–5)
HGB BLD-MCNC: 13.7 G/DL (ref 13.6–17.2)
HGB UR QL STRIP: NEGATIVE
IMM GRANULOCYTES # BLD AUTO: 0.01 K/UL (ref 0–0.5)
IMM GRANULOCYTES NFR BLD AUTO: 0.2 % (ref 0–5)
KETONES UR QL STRIP.AUTO: NEGATIVE MG/DL
LDLC SERPL CALC-MCNC: 61 MG/DL (ref 0–100)
LEUKOCYTE ESTERASE UR QL STRIP.AUTO: NEGATIVE
LYMPHOCYTES # BLD: 1.93 K/UL (ref 0.5–4.6)
LYMPHOCYTES NFR BLD: 41.1 % (ref 13–44)
MAGNESIUM SERPL-MCNC: 2 MG/DL (ref 1.8–2.4)
MCH RBC QN AUTO: 28.9 PG (ref 26.1–32.9)
MCHC RBC AUTO-ENTMCNC: 32.7 G/DL (ref 31.4–35)
MCV RBC AUTO: 88.4 FL (ref 82–102)
MICROALBUMIN UR-MCNC: 4.53 MG/DL (ref 0–20)
MICROALBUMIN/CREAT UR-RTO: 28 MG/G (ref 0–30)
MONOCYTES # BLD: 0.48 K/UL (ref 0.1–1.3)
MONOCYTES NFR BLD: 10.2 % (ref 4–12)
NEUTS SEG # BLD: 2.15 K/UL (ref 1.7–8.2)
NEUTS SEG NFR BLD: 45.7 % (ref 43–78)
NITRITE UR QL STRIP.AUTO: NEGATIVE
NRBC # BLD: 0 K/UL (ref 0–0.2)
PH UR STRIP: 5 (ref 5–9)
PLATELET # BLD AUTO: 187 K/UL (ref 150–450)
PMV BLD AUTO: 10.7 FL (ref 9.4–12.3)
POTASSIUM SERPL-SCNC: 3.8 MMOL/L (ref 3.5–5.1)
PROT SERPL-MCNC: 6.9 G/DL (ref 6.3–8.2)
PROT UR STRIP-MCNC: NEGATIVE MG/DL
PSA SERPL-MCNC: 3.1 NG/ML (ref 0–4)
RBC # BLD AUTO: 4.74 M/UL (ref 4.23–5.6)
SODIUM SERPL-SCNC: 142 MMOL/L (ref 136–145)
SP GR UR REFRACTOMETRY: 1.02 (ref 1–1.02)
TRIGL SERPL-MCNC: 132 MG/DL (ref 0–150)
TSH, 3RD GENERATION: 11.1 UIU/ML (ref 0.27–4.2)
UROBILINOGEN UR QL STRIP.AUTO: 0.2 EU/DL (ref 0.2–1)
VLDLC SERPL CALC-MCNC: 26 MG/DL (ref 6–23)
WBC # BLD AUTO: 4.7 K/UL (ref 4.3–11.1)

## 2025-01-08 SDOH — ECONOMIC STABILITY: FOOD INSECURITY: WITHIN THE PAST 12 MONTHS, YOU WORRIED THAT YOUR FOOD WOULD RUN OUT BEFORE YOU GOT MONEY TO BUY MORE.: NEVER TRUE

## 2025-01-08 SDOH — ECONOMIC STABILITY: FOOD INSECURITY: WITHIN THE PAST 12 MONTHS, THE FOOD YOU BOUGHT JUST DIDN'T LAST AND YOU DIDN'T HAVE MONEY TO GET MORE.: NEVER TRUE

## 2025-01-08 ASSESSMENT — PATIENT HEALTH QUESTIONNAIRE - PHQ9
SUM OF ALL RESPONSES TO PHQ QUESTIONS 1-9: 0
SUM OF ALL RESPONSES TO PHQ9 QUESTIONS 1 & 2: 0
2. FEELING DOWN, DEPRESSED OR HOPELESS: NOT AT ALL
SUM OF ALL RESPONSES TO PHQ QUESTIONS 1-9: 0
1. LITTLE INTEREST OR PLEASURE IN DOING THINGS: NOT AT ALL
SUM OF ALL RESPONSES TO PHQ QUESTIONS 1-9: 0
SUM OF ALL RESPONSES TO PHQ QUESTIONS 1-9: 0

## 2025-01-08 NOTE — PROGRESS NOTES
FOLLOW UP VISIT    Subjective:    Sameer Sotelo (: 1940) is a 84 y.o., male,   Chief Complaint   Patient presents with    Follow-up    Hypertension       HPI:    In the past 2 months sugars no higher than 147.    Seen at urgent care for what sounds like epididymitis, pain has resolved no swelling    The following portions of the patient's history were reviewed and updated as appropriate:      Current Outpatient Medications   Medication Sig Dispense Refill    Brimonidine Tartrate (LUMIFY OP) Apply to eye daily      glipiZIDE-metFORMIN (METAGLIP) 5-500 MG per tablet Take 2 tabs qam and 1 tab qhs. 360 tablet 2    glucose monitoring kit To check blood sugar once daily. DX: E11.40 1 kit 0    Lancets MISC To check blood sugar once daily. DX: E11.40 100 each 11    levothyroxine (SYNTHROID) 25 MCG tablet Take 1 tablet by mouth daily 90 tablet 3    glucose monitoring kit 1 kit by Does not apply route daily Check blood sugar daily. DX E11.40 1 kit 0    Blood Glucose Monitoring Suppl (ACCU-CHEK GUIDE ME) w/Device KIT Inject 1 each into the skin daily 1 kit 0    amLODIPine (NORVASC) 5 MG tablet Take 1 tablet by mouth daily 90 tablet 3    atorvastatin (LIPITOR) 20 MG tablet Take 1 tablet by mouth daily 90 tablet 3    hydroCHLOROthiazide (HYDRODIURIL) 25 MG tablet Take 1 tablet by mouth daily 90 tablet 3    levothyroxine (SYNTHROID) 200 MCG tablet Take 1 tablet by mouth daily 90 tablet 3    losartan (COZAAR) 100 MG tablet Take 1 tablet by mouth daily 90 tablet 3    blood glucose test strips (ACCU-CHEK GUIDE) strip Inject 1 each into the skin daily 100 strip 11    metoprolol succinate (TOPROL XL) 50 MG extended release tablet Take 1 tablet by mouth daily 90 tablet 3    vitamin B-12 (CYANOCOBALAMIN) 100 MCG tablet Take 10 tablets by mouth daily      acetaminophen (TYLENOL) 500 MG tablet Take 1 tablet by mouth every 6 hours as needed for Pain      bimatoprost (LUMIGAN) 0.01 % SOLN ophthalmic drops Place 1 drop into both eyes

## 2025-03-05 RX ORDER — BLOOD-GLUCOSE METER
EACH MISCELLANEOUS
Qty: 1 KIT | Refills: 3 | Status: SHIPPED | OUTPATIENT
Start: 2025-03-05

## 2025-03-27 NOTE — PROGRESS NOTES
labs above       NSVT (nonsustained ventricular tachycardia) (HCC)-clinically asymptomatic but recurrent.  Recheck echo since 100% RV pacing.  Lexiscan nuclear stress test abnormal with possible inferior scar, potentially scar mediated recurrent VT, but LHC with minimal coronary irregularities and low normal EF with RV pacing.  Added Toprol-XL 25 mg nightly, decreased hydrochlorothiazide to 12.5 mg daily (stop completely with any postural symptoms in the future, encouraged adequate hydration on a daily basis).  Follow arrhythmia burden on pacemaker, EP referral if worsening or symptomatic in the future.    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  Check sleep study            Return in about 6 months (around 10/2/2025).         AVIVA COLEMAN MD  4/2/2025  9:01 AM

## 2025-04-02 ENCOUNTER — CLINICAL SUPPORT (OUTPATIENT)
Age: 85
End: 2025-04-02
Payer: MEDICARE

## 2025-04-02 ENCOUNTER — OFFICE VISIT (OUTPATIENT)
Age: 85
End: 2025-04-02
Payer: MEDICARE

## 2025-04-02 VITALS — HEIGHT: 71 IN | DIASTOLIC BLOOD PRESSURE: 83 MMHG | SYSTOLIC BLOOD PRESSURE: 142 MMHG | BODY MASS INDEX: 30.27 KG/M2

## 2025-04-02 DIAGNOSIS — Z95.0 PACEMAKER: ICD-10-CM

## 2025-04-02 DIAGNOSIS — I44.2 COMPLETE HEART BLOCK (HCC): ICD-10-CM

## 2025-04-02 DIAGNOSIS — R53.82 CHRONIC FATIGUE: ICD-10-CM

## 2025-04-02 DIAGNOSIS — I47.29 NSVT (NONSUSTAINED VENTRICULAR TACHYCARDIA) (HCC): ICD-10-CM

## 2025-04-02 DIAGNOSIS — E78.00 HYPERCHOLESTEROLEMIA: ICD-10-CM

## 2025-04-02 DIAGNOSIS — I10 ESSENTIAL HYPERTENSION: Primary | ICD-10-CM

## 2025-04-02 DIAGNOSIS — I25.5 ISCHEMIC CARDIOMYOPATHY: ICD-10-CM

## 2025-04-02 DIAGNOSIS — E11.40 CONTROLLED TYPE 2 DIABETES MELLITUS WITH DIABETIC NEUROPATHY, WITHOUT LONG-TERM CURRENT USE OF INSULIN (HCC): Chronic | ICD-10-CM

## 2025-04-02 PROCEDURE — 1123F ACP DISCUSS/DSCN MKR DOCD: CPT | Performed by: INTERNAL MEDICINE

## 2025-04-02 PROCEDURE — 1036F TOBACCO NON-USER: CPT | Performed by: INTERNAL MEDICINE

## 2025-04-02 PROCEDURE — 1160F RVW MEDS BY RX/DR IN RCRD: CPT | Performed by: INTERNAL MEDICINE

## 2025-04-02 PROCEDURE — 1159F MED LIST DOCD IN RCRD: CPT | Performed by: INTERNAL MEDICINE

## 2025-04-02 PROCEDURE — 3077F SYST BP >= 140 MM HG: CPT | Performed by: INTERNAL MEDICINE

## 2025-04-02 PROCEDURE — 3079F DIAST BP 80-89 MM HG: CPT | Performed by: INTERNAL MEDICINE

## 2025-04-02 PROCEDURE — 1126F AMNT PAIN NOTED NONE PRSNT: CPT | Performed by: INTERNAL MEDICINE

## 2025-04-02 PROCEDURE — G8417 CALC BMI ABV UP PARAM F/U: HCPCS | Performed by: INTERNAL MEDICINE

## 2025-04-02 PROCEDURE — 3051F HG A1C>EQUAL 7.0%<8.0%: CPT | Performed by: INTERNAL MEDICINE

## 2025-04-02 PROCEDURE — G8427 DOCREV CUR MEDS BY ELIG CLIN: HCPCS | Performed by: INTERNAL MEDICINE

## 2025-04-02 PROCEDURE — 99214 OFFICE O/P EST MOD 30 MIN: CPT | Performed by: INTERNAL MEDICINE

## 2025-04-02 RX ORDER — LOSARTAN POTASSIUM 100 MG/1
100 TABLET ORAL DAILY
Qty: 90 TABLET | Refills: 3 | Status: SHIPPED | OUTPATIENT
Start: 2025-04-02

## 2025-04-02 RX ORDER — METOPROLOL SUCCINATE 50 MG/1
50 TABLET, EXTENDED RELEASE ORAL DAILY
Qty: 90 TABLET | Refills: 3 | Status: SHIPPED | OUTPATIENT
Start: 2025-04-02

## 2025-04-02 RX ORDER — AMLODIPINE BESYLATE 5 MG/1
5 TABLET ORAL DAILY
Qty: 90 TABLET | Refills: 3 | Status: SHIPPED | OUTPATIENT
Start: 2025-04-02

## 2025-04-02 RX ORDER — ATORVASTATIN CALCIUM 20 MG/1
20 TABLET, FILM COATED ORAL DAILY
Qty: 90 TABLET | Refills: 3 | Status: SHIPPED | OUTPATIENT
Start: 2025-04-02

## 2025-04-03 PROCEDURE — 93280 PM DEVICE PROGR EVAL DUAL: CPT | Performed by: INTERNAL MEDICINE

## 2025-04-05 DIAGNOSIS — I10 ESSENTIAL HYPERTENSION: Primary | ICD-10-CM

## 2025-04-05 DIAGNOSIS — E11.40 CONTROLLED TYPE 2 DIABETES MELLITUS WITH DIABETIC NEUROPATHY, WITHOUT LONG-TERM CURRENT USE OF INSULIN (HCC): ICD-10-CM

## 2025-04-07 RX ORDER — BLOOD SUGAR DIAGNOSTIC
STRIP MISCELLANEOUS
Qty: 100 STRIP | Refills: 1 | Status: SHIPPED | OUTPATIENT
Start: 2025-04-07

## 2025-04-07 RX ORDER — HYDROCHLOROTHIAZIDE 25 MG/1
25 TABLET ORAL DAILY
Qty: 90 TABLET | Refills: 1 | Status: SHIPPED | OUTPATIENT
Start: 2025-04-07

## 2025-04-07 NOTE — TELEPHONE ENCOUNTER
[facility] last written 5/10/24 for #90 with 3 refills. Pt last seen 1/8/25 and next appt is 7/30/25. Rx pended.

## 2025-04-15 DIAGNOSIS — E03.9 ACQUIRED HYPOTHYROIDISM: ICD-10-CM

## 2025-04-15 RX ORDER — LEVOTHYROXINE SODIUM 200 UG/1
200 TABLET ORAL DAILY
Qty: 90 TABLET | Refills: 3 | OUTPATIENT
Start: 2025-04-15

## 2025-06-04 DIAGNOSIS — E03.9 ACQUIRED HYPOTHYROIDISM: ICD-10-CM

## 2025-06-04 RX ORDER — LEVOTHYROXINE SODIUM 200 UG/1
200 TABLET ORAL DAILY
Qty: 90 TABLET | Refills: 3 | OUTPATIENT
Start: 2025-06-04

## 2025-07-30 ENCOUNTER — OFFICE VISIT (OUTPATIENT)
Dept: INTERNAL MEDICINE CLINIC | Facility: CLINIC | Age: 85
End: 2025-07-30
Payer: MEDICARE

## 2025-07-30 VITALS
BODY MASS INDEX: 31.07 KG/M2 | WEIGHT: 217 LBS | DIASTOLIC BLOOD PRESSURE: 86 MMHG | SYSTOLIC BLOOD PRESSURE: 140 MMHG | OXYGEN SATURATION: 98 % | HEART RATE: 78 BPM | HEIGHT: 70 IN

## 2025-07-30 DIAGNOSIS — I10 ESSENTIAL HYPERTENSION: ICD-10-CM

## 2025-07-30 DIAGNOSIS — Z00.00 INITIAL MEDICARE ANNUAL WELLNESS VISIT: Primary | ICD-10-CM

## 2025-07-30 DIAGNOSIS — E11.40 CONTROLLED TYPE 2 DIABETES MELLITUS WITH DIABETIC NEUROPATHY, WITHOUT LONG-TERM CURRENT USE OF INSULIN (HCC): ICD-10-CM

## 2025-07-30 DIAGNOSIS — E03.9 ACQUIRED HYPOTHYROIDISM: ICD-10-CM

## 2025-07-30 DIAGNOSIS — E83.42 HYPOMAGNESEMIA: ICD-10-CM

## 2025-07-30 LAB
ALBUMIN SERPL-MCNC: 3.7 G/DL (ref 3.2–4.6)
ALBUMIN/GLOB SERPL: 1.2 (ref 1–1.9)
ALP SERPL-CCNC: 85 U/L (ref 40–129)
ALT SERPL-CCNC: 32 U/L (ref 8–55)
ANION GAP SERPL CALC-SCNC: 12 MMOL/L (ref 7–16)
AST SERPL-CCNC: 35 U/L (ref 15–37)
BASOPHILS # BLD: 0.04 K/UL (ref 0–0.2)
BASOPHILS NFR BLD: 1.1 % (ref 0–2)
BILIRUB SERPL-MCNC: 0.9 MG/DL (ref 0–1.2)
BUN SERPL-MCNC: 15 MG/DL (ref 8–23)
CALCIUM SERPL-MCNC: 9.1 MG/DL (ref 8.8–10.2)
CHLORIDE SERPL-SCNC: 106 MMOL/L (ref 98–107)
CHOLEST SERPL-MCNC: 144 MG/DL (ref 0–200)
CO2 SERPL-SCNC: 25 MMOL/L (ref 20–29)
CREAT SERPL-MCNC: 1.09 MG/DL (ref 0.8–1.3)
DIFFERENTIAL METHOD BLD: ABNORMAL
EOSINOPHIL # BLD: 0.1 K/UL (ref 0–0.8)
EOSINOPHIL NFR BLD: 2.6 % (ref 0.5–7.8)
ERYTHROCYTE [DISTWIDTH] IN BLOOD BY AUTOMATED COUNT: 14.1 % (ref 11.9–14.6)
EST. AVERAGE GLUCOSE BLD GHB EST-MCNC: 179 MG/DL
GLOBULIN SER CALC-MCNC: 3.1 G/DL (ref 2.3–3.5)
GLUCOSE SERPL-MCNC: 114 MG/DL (ref 70–99)
HBA1C MFR BLD: 7.9 % (ref 0–5.6)
HCT VFR BLD AUTO: 42.9 % (ref 41.1–50.3)
HDLC SERPL-MCNC: 51 MG/DL (ref 40–60)
HDLC SERPL: 2.8 (ref 0–5)
HGB BLD-MCNC: 13.7 G/DL (ref 13.6–17.2)
IMM GRANULOCYTES # BLD AUTO: 0 K/UL (ref 0–0.5)
IMM GRANULOCYTES NFR BLD AUTO: 0 % (ref 0–5)
LDLC SERPL CALC-MCNC: 74 MG/DL (ref 0–100)
LYMPHOCYTES # BLD: 1.9 K/UL (ref 0.5–4.6)
LYMPHOCYTES NFR BLD: 50.1 % (ref 13–44)
MAGNESIUM SERPL-MCNC: 1.9 MG/DL (ref 1.8–2.4)
MCH RBC QN AUTO: 29.3 PG (ref 26.1–32.9)
MCHC RBC AUTO-ENTMCNC: 31.9 G/DL (ref 31.4–35)
MCV RBC AUTO: 91.9 FL (ref 82–102)
MONOCYTES # BLD: 0.33 K/UL (ref 0.1–1.3)
MONOCYTES NFR BLD: 8.7 % (ref 4–12)
NEUTS SEG # BLD: 1.42 K/UL (ref 1.7–8.2)
NEUTS SEG NFR BLD: 37.5 % (ref 43–78)
NRBC # BLD: 0 K/UL (ref 0–0.2)
PLATELET # BLD AUTO: 181 K/UL (ref 150–450)
PMV BLD AUTO: 10.9 FL (ref 9.4–12.3)
POTASSIUM SERPL-SCNC: 4 MMOL/L (ref 3.5–5.1)
PROT SERPL-MCNC: 6.8 G/DL (ref 6.3–8.2)
RBC # BLD AUTO: 4.67 M/UL (ref 4.23–5.6)
SODIUM SERPL-SCNC: 143 MMOL/L (ref 136–145)
TRIGL SERPL-MCNC: 93 MG/DL (ref 0–150)
TSH, 3RD GENERATION: 8.4 UIU/ML (ref 0.27–4.2)
VLDLC SERPL CALC-MCNC: 19 MG/DL (ref 6–23)
WBC # BLD AUTO: 3.8 K/UL (ref 4.3–11.1)

## 2025-07-30 PROCEDURE — 99214 OFFICE O/P EST MOD 30 MIN: CPT | Performed by: STUDENT IN AN ORGANIZED HEALTH CARE EDUCATION/TRAINING PROGRAM

## 2025-07-30 PROCEDURE — G8427 DOCREV CUR MEDS BY ELIG CLIN: HCPCS | Performed by: STUDENT IN AN ORGANIZED HEALTH CARE EDUCATION/TRAINING PROGRAM

## 2025-07-30 PROCEDURE — G0438 PPPS, INITIAL VISIT: HCPCS | Performed by: STUDENT IN AN ORGANIZED HEALTH CARE EDUCATION/TRAINING PROGRAM

## 2025-07-30 PROCEDURE — 1036F TOBACCO NON-USER: CPT | Performed by: STUDENT IN AN ORGANIZED HEALTH CARE EDUCATION/TRAINING PROGRAM

## 2025-07-30 PROCEDURE — 3051F HG A1C>EQUAL 7.0%<8.0%: CPT | Performed by: STUDENT IN AN ORGANIZED HEALTH CARE EDUCATION/TRAINING PROGRAM

## 2025-07-30 PROCEDURE — 1160F RVW MEDS BY RX/DR IN RCRD: CPT | Performed by: STUDENT IN AN ORGANIZED HEALTH CARE EDUCATION/TRAINING PROGRAM

## 2025-07-30 PROCEDURE — 3077F SYST BP >= 140 MM HG: CPT | Performed by: STUDENT IN AN ORGANIZED HEALTH CARE EDUCATION/TRAINING PROGRAM

## 2025-07-30 PROCEDURE — 1159F MED LIST DOCD IN RCRD: CPT | Performed by: STUDENT IN AN ORGANIZED HEALTH CARE EDUCATION/TRAINING PROGRAM

## 2025-07-30 PROCEDURE — G8417 CALC BMI ABV UP PARAM F/U: HCPCS | Performed by: STUDENT IN AN ORGANIZED HEALTH CARE EDUCATION/TRAINING PROGRAM

## 2025-07-30 PROCEDURE — 3079F DIAST BP 80-89 MM HG: CPT | Performed by: STUDENT IN AN ORGANIZED HEALTH CARE EDUCATION/TRAINING PROGRAM

## 2025-07-30 PROCEDURE — 1123F ACP DISCUSS/DSCN MKR DOCD: CPT | Performed by: STUDENT IN AN ORGANIZED HEALTH CARE EDUCATION/TRAINING PROGRAM

## 2025-07-30 RX ORDER — HYDROCHLOROTHIAZIDE 25 MG/1
25 TABLET ORAL DAILY
Qty: 90 TABLET | Refills: 3 | Status: SHIPPED | OUTPATIENT
Start: 2025-07-30

## 2025-07-30 RX ORDER — LEVOTHYROXINE SODIUM 25 UG/1
25 TABLET ORAL DAILY
Qty: 90 TABLET | Refills: 3 | Status: SHIPPED | OUTPATIENT
Start: 2025-07-30

## 2025-07-30 RX ORDER — GLIPIZIDE AND METFORMIN HCL 5; 500 MG/1; MG/1
TABLET, FILM COATED ORAL
Qty: 360 TABLET | Refills: 3 | Status: SHIPPED | OUTPATIENT
Start: 2025-07-30

## 2025-07-30 RX ORDER — LEVOTHYROXINE SODIUM 200 UG/1
200 TABLET ORAL DAILY
Qty: 90 TABLET | Refills: 3 | Status: SHIPPED | OUTPATIENT
Start: 2025-07-30

## 2025-07-30 ASSESSMENT — PATIENT HEALTH QUESTIONNAIRE - PHQ9
SUM OF ALL RESPONSES TO PHQ QUESTIONS 1-9: 0
1. LITTLE INTEREST OR PLEASURE IN DOING THINGS: NOT AT ALL
SUM OF ALL RESPONSES TO PHQ QUESTIONS 1-9: 0
SUM OF ALL RESPONSES TO PHQ QUESTIONS 1-9: 0
2. FEELING DOWN, DEPRESSED OR HOPELESS: NOT AT ALL
SUM OF ALL RESPONSES TO PHQ QUESTIONS 1-9: 0

## 2025-07-30 ASSESSMENT — LIFESTYLE VARIABLES
HOW OFTEN DO YOU HAVE A DRINK CONTAINING ALCOHOL: NEVER
HOW MANY STANDARD DRINKS CONTAINING ALCOHOL DO YOU HAVE ON A TYPICAL DAY: PATIENT DOES NOT DRINK

## 2025-07-30 NOTE — PATIENT INSTRUCTIONS
cholesterol. If you think you may have a problem with alcohol or drug use, talk to your doctor.   Medicines    Take your medicines exactly as prescribed. Call your doctor if you think you are having a problem with your medicine.     If your doctor recommends aspirin, take the amount directed each day. Make sure you take aspirin and not another kind of pain reliever, such as acetaminophen (Tylenol).   When should you call for help?   Call 911 if you have symptoms of a heart attack. These may include:    Chest pain or pressure, or a strange feeling in the chest.     Sweating.     Shortness of breath.     Pain, pressure, or a strange feeling in the back, neck, jaw, or upper belly or in one or both shoulders or arms.     Lightheadedness or sudden weakness.     A fast or irregular heartbeat.   After you call 911, the  may tell you to chew 1 adult-strength or 2 to 4 low-dose aspirin. Wait for an ambulance. Do not try to drive yourself.  Watch closely for changes in your health, and be sure to contact your doctor if you have any problems.  Where can you learn more?  Go to https://www.[employer].net/patientEd and enter F075 to learn more about \"A Healthy Heart: Care Instructions.\"  Current as of: July 31, 2024  Content Version: 14.5  © 1304-8873 [employer].   Care instructions adapted under license by [employer]. If you have questions about a medical condition or this instruction, always ask your healthcare professional. [employer] disclaims any warranty or liability for your use of this information.    Personalized Preventive Plan for Sameer Sotelo - 7/30/2025  Medicare offers a range of preventive health benefits. Some of the tests and screenings are paid in full while other may be subject to a deductible, co-insurance, and/or copay.  Some of these benefits include a comprehensive review of your medical history including lifestyle, illnesses that may run in your family, and various

## 2025-07-30 NOTE — PROGRESS NOTES
Medicare Annual Wellness Visit    Sameer Sotelo is here for Medicare AWV    Assessment & Plan  Type 2 Diabetes Mellitus:  - Blood glucose 211 mg/dL this morning  - Last A1c 7.1%  - Advised healthy diet and regular exercise  - Prescription refill for Metaglip to mail-order pharmacy  - Labs checked today to monitor A1c and other parameters    Hypertension:  - /80 mmHg  - he will get metoprolol from pharmacy    Hypothyroidism:  - previous TSH 11, indicating undertreatment  - Take levothyroxine first thing in the morning, separate from other medications, wait 30 minutes before eating  - check today      Initial Medicare annual wellness visit  Controlled type 2 diabetes mellitus with diabetic neuropathy, without long-term current use of insulin (HCC)  -     glipiZIDE-metFORMIN (METAGLIP) 5-500 MG per tablet; Take 2 tabs qam and 1 tab qhs., Disp-360 tablet, R-3Normal  -     Lipid Panel; Future  -     Comprehensive Metabolic Panel; Future  -     CBC with Auto Differential; Future  -     Hemoglobin A1C; Future  Acquired hypothyroidism  -     levothyroxine (SYNTHROID) 25 MCG tablet; Take 1 tablet by mouth daily, Disp-90 tablet, R-3Normal  -     levothyroxine (SYNTHROID) 200 MCG tablet; Take 1 tablet by mouth daily, Disp-90 tablet, R-3Normal  -     TSH; Future  Essential hypertension  -     hydroCHLOROthiazide (HYDRODIURIL) 25 MG tablet; Take 1 tablet by mouth daily, Disp-90 tablet, R-3Normal  Hypomagnesemia  -     Magnesium; Future    Results  Labs  - Blood sugar: 211 mg/dL  - A1c: 7.1%  - Thyroid level: 11       Return in about 4 months (around 11/30/2025) for DM.     Subjective     History of Present Illness  The patient is an 85-year-old male presenting for a Medicare wellness visit. He has a medical history significant for hypertension and type 2 diabetes mellitus.    The patient regularly monitors his blood glucose levels, with recent readings ranging from 130 to 141 mg/dL. This morning, his blood glucose level

## (undated) DEVICE — DRESSING POSTOP AG PRISMASEAL 3.5X6IN

## (undated) DEVICE — GOWN,REINFORCED,POLY,AURORA,XXLARGE,STR: Brand: MEDLINE

## (undated) DEVICE — DEVICE STBL AD TRICOT ANCHR PD FOR 3 W F CATH STATLOK

## (undated) DEVICE — SUTURE ABSORBABLE BRAIDED 2-0 CT-1 27 IN UD VICRYL J259H

## (undated) DEVICE — CATHETER COR DIAG 4.0 5FR 110CM 2 SIDE H

## (undated) DEVICE — BAND COMPR L24CM REG CLR PLAS HEMSTAT EXT HK AND LOOP RETEN

## (undated) DEVICE — PLASMABLADE X PS210-030S-LIGHT 3.0SL: Brand: PLASMABLADE™ X

## (undated) DEVICE — TURP TURB: Brand: MEDLINE INDUSTRIES, INC.

## (undated) DEVICE — SOLUTION IRRIG 3000ML 1.5% GL USP UROMATIC CONT

## (undated) DEVICE — GUIDE NDL ST W/ 14 X 147CM TELESCOPICALLY FLD CIV FLX CVR

## (undated) DEVICE — GLIDESHEATH SLENDER STAINLESS STEEL KIT: Brand: GLIDESHEATH SLENDER

## (undated) DEVICE — INTRODUCER SHTH L13CM OD7FR SH ORNG HUB SEAMLESS SAFSHTH

## (undated) DEVICE — BAG,DRAINAGE,4L,A/R TOWER,LL,SLIDE TAP: Brand: MEDLINE

## (undated) DEVICE — GLOVE SURG SZ 8 CRM LTX FREE POLYISOPRENE POLYMER BEAD ANTI

## (undated) DEVICE — SYRINGE,TOOMEY,IRRIGATION,70CC,STERILE: Brand: MEDLINE

## (undated) DEVICE — SOLUTION IRRIG 1000ML STRL H2O USP PLAS POUR BTL

## (undated) DEVICE — CATHETER DIAG AD 5FR L110CM 145DEG COR GRY HYDRPHLC NYL

## (undated) DEVICE — IMMOBILIZER SHLDR M L8X16.5IN R/L CANVS W/ WAIST STRP THMB

## (undated) DEVICE — GUIDEWIRE 035IN 210CM PTFE COAT FIX COR J TIP 15MM FIRM BODY

## (undated) DEVICE — SUTURE TICRON SZ 0 L36IN NONABSORBABLE BLU CV 300 L35MM 1 2 8886339361

## (undated) DEVICE — SUTURE 3-0 VCRL CTD SH-1 J219H

## (undated) DEVICE — SKIN AFFIX SURG ADHESIVE 72/CS 0.55ML: Brand: MEDLINE

## (undated) DEVICE — ELECTRODE LOOP 27FR WIRE DIA0.35MM BRN CUT ANG 1 STEM W/

## (undated) DEVICE — HEMOSTAT TOP 5ML CLLGN THROM FLOWABLE SYR D-STAT

## (undated) DEVICE — SUTURE VCRL + SZ 4-0 L27IN ABSRB UD PS-2 3/8 CIR REV CUT VCP426H

## (undated) DEVICE — CATHETER URETH 24FR BLLN 30CC STD LTX 3 W TWO OPP DRNGE EYE